# Patient Record
Sex: FEMALE | Race: WHITE | NOT HISPANIC OR LATINO | Employment: FULL TIME | URBAN - NONMETROPOLITAN AREA
[De-identification: names, ages, dates, MRNs, and addresses within clinical notes are randomized per-mention and may not be internally consistent; named-entity substitution may affect disease eponyms.]

---
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
## 2018-01-07 ENCOUNTER — HOSPITAL ENCOUNTER (EMERGENCY)
Facility: HOSPITAL | Age: 18
Discharge: HOME | End: 2018-01-08
Attending: EMERGENCY MEDICINE | Admitting: EMERGENCY MEDICINE

## 2018-01-07 DIAGNOSIS — J20.9 ACUTE BRONCHITIS, UNSPECIFIED ORGANISM: Primary | ICD-10-CM

## 2018-01-07 DIAGNOSIS — J45.901 EXACERBATION OF ASTHMA, UNSPECIFIED ASTHMA SEVERITY, UNSPECIFIED WHETHER PERSISTENT: ICD-10-CM

## 2018-01-07 LAB
FLUAV AG NPH QL: NEGATIVE
FLUBV AG NPH QL IA: NEGATIVE

## 2018-01-07 PROCEDURE — 87804 INFLUENZA ASSAY W/OPTIC: CPT | Performed by: EMERGENCY MEDICINE

## 2018-01-07 PROCEDURE — 81025 URINE PREGNANCY TEST: CPT | Performed by: PHYSICIAN ASSISTANT

## 2018-01-07 PROCEDURE — 94760 N-INVAS EAR/PLS OXIMETRY 1: CPT

## 2018-01-07 PROCEDURE — 81001 URINALYSIS AUTO W/SCOPE: CPT | Performed by: PHYSICIAN ASSISTANT

## 2018-01-07 PROCEDURE — 94799 UNLISTED PULMONARY SVC/PX: CPT

## 2018-01-07 PROCEDURE — 94640 AIRWAY INHALATION TREATMENT: CPT

## 2018-01-07 PROCEDURE — 87086 URINE CULTURE/COLONY COUNT: CPT | Performed by: PHYSICIAN ASSISTANT

## 2018-01-07 PROCEDURE — 99283 EMERGENCY DEPT VISIT LOW MDM: CPT

## 2018-01-07 RX ORDER — ALBUTEROL SULFATE 2.5 MG/3ML
2.5 SOLUTION RESPIRATORY (INHALATION) ONCE
Status: COMPLETED | OUTPATIENT
Start: 2018-01-07 | End: 2018-01-07

## 2018-01-07 RX ADMIN — ALBUTEROL SULFATE 2.5 MG: 2.5 SOLUTION RESPIRATORY (INHALATION) at 23:49

## 2018-01-08 ENCOUNTER — APPOINTMENT (OUTPATIENT)
Dept: GENERAL RADIOLOGY | Facility: HOSPITAL | Age: 18
End: 2018-01-08

## 2018-01-08 VITALS
BODY MASS INDEX: 42.68 KG/M2 | OXYGEN SATURATION: 99 % | RESPIRATION RATE: 18 BRPM | DIASTOLIC BLOOD PRESSURE: 84 MMHG | SYSTOLIC BLOOD PRESSURE: 127 MMHG | WEIGHT: 250 LBS | TEMPERATURE: 98.7 F | HEIGHT: 64 IN | HEART RATE: 99 BPM

## 2018-01-08 LAB
B-HCG UR QL: NEGATIVE
BACTERIA UR QL AUTO: ABNORMAL /HPF
BILIRUB UR QL STRIP: NEGATIVE
CLARITY UR: CLEAR
COLOR UR: YELLOW
GLUCOSE UR STRIP-MCNC: NEGATIVE MG/DL
HGB UR QL STRIP.AUTO: ABNORMAL
HYALINE CASTS UR QL AUTO: ABNORMAL /LPF
KETONES UR QL STRIP: NEGATIVE
LEUKOCYTE ESTERASE UR QL STRIP.AUTO: NEGATIVE
NITRITE UR QL STRIP: NEGATIVE
PH UR STRIP.AUTO: 5.5 [PH] (ref 5–8)
PROT UR QL STRIP: NEGATIVE
RBC # UR: ABNORMAL /HPF
REF LAB TEST METHOD: ABNORMAL
SP GR UR STRIP: 1.02 (ref 1–1.03)
SQUAMOUS #/AREA URNS HPF: ABNORMAL /HPF
UROBILINOGEN UR QL STRIP: ABNORMAL
WBC UR QL AUTO: ABNORMAL /HPF

## 2018-01-08 PROCEDURE — 71046 X-RAY EXAM CHEST 2 VIEWS: CPT

## 2018-01-08 RX ORDER — AZITHROMYCIN 250 MG/1
500 TABLET, FILM COATED ORAL ONCE
Status: COMPLETED | OUTPATIENT
Start: 2018-01-08 | End: 2018-01-08

## 2018-01-08 RX ORDER — AZITHROMYCIN 250 MG/1
TABLET, FILM COATED ORAL
Qty: 4 TABLET | Refills: 0 | Status: SHIPPED | OUTPATIENT
Start: 2018-01-08 | End: 2018-01-24

## 2018-01-08 RX ADMIN — AZITHROMYCIN 500 MG: 250 TABLET, FILM COATED ORAL at 01:12

## 2018-01-08 NOTE — DISCHARGE INSTRUCTIONS
Finish the steroid pack.  Continue your inhaler.    Discontinue amoxicillin.    Return to the ER for markedly labored breathing, chest pain, shortness of breath, sustained heart racing, passing out, new or worsening symptoms.    Follow-up with your doctor in 1-2 days for further workup, treatment and evaluation if not improving.  If you do not have a doctor, follow-up with the [facility].  Call for appointment.

## 2018-01-08 NOTE — ED PROVIDER NOTES
Subjective   HPI Comments: 17-year-old female with a history of asthma and is a nonsmoker.  She is here with a 1.5 week history of clear and yellow/green nasal and chest congestion associated with shortness of breath and wheezing.  Also some maxillary and frontal sinus pressure.  Mild sore throat and no earache.  Also complains of being dizzy with the coughing fits.  Complains of intermittent wheezing and shortness of breath as well.  She has been on amoxicillin, tapering dose of steroids, albuterol inhaler, neb treatments and over-the-counter cold medications for the past week and is not improving.    Aggravating factors: None.  Alleviating factors: Inhalers and breathing treatments.  Treatment prior to arrival: Amoxicillin, steroids, over-the-counter cold medications, albuterol inhaler and neb treatments.    LMP more than one month ago.  Always irregular and denies the possibility of pregnancy.      History provided by:  Patient  History limited by:  Acuity of condition   used: No        Review of Systems   Constitutional: Positive for chills.   HENT: Positive for congestion, rhinorrhea, sinus pain, sinus pressure and sore throat.    Eyes: Negative.    Respiratory: Positive for cough, shortness of breath and wheezing.    Cardiovascular: Negative.  Negative for chest pain.   Gastrointestinal: Negative.  Negative for abdominal pain.   Endocrine: Negative.    Genitourinary: Negative for dysuria.   Musculoskeletal: Negative.    Skin: Negative.    Allergic/Immunologic: Negative.    Neurological: Positive for dizziness and headaches.   Hematological: Negative.    Psychiatric/Behavioral: Negative.    All other systems reviewed and are negative.      Past Medical History:   Diagnosis Date   • Anxiety    • Asthma    • B12 deficiency    • Depression    • Pneumonia    • Seasonal allergies        Allergies   Allergen Reactions   • Hepatitis B Virus Vaccine    • Meningococcal Vaccines        Past Surgical  History:   Procedure Laterality Date   • ADENOIDECTOMY     • TONSILLECTOMY         Family History   Problem Relation Age of Onset   • Bipolar disorder Maternal Aunt    • Arthritis Maternal Aunt    • Cancer Maternal Uncle      Lung   • Lung cancer Maternal Uncle    • Arthritis Maternal Grandmother    • Heart disease Maternal Grandmother    • Hyperlipidemia Maternal Grandmother    • Hypertension Maternal Grandmother    • Coronary artery disease Maternal Grandmother      CABG   • Cancer Maternal Grandfather      asbestos   • Lung cancer Maternal Grandfather    • No Known Problems Mother    • Arthritis Father    • Depression Brother    • No Known Problems Paternal Aunt    • No Known Problems Paternal Uncle    • No Known Problems Paternal Grandmother    • No Known Problems Paternal Grandfather    • Bipolar disorder Cousin        Social History     Social History   • Marital status: Single     Spouse name: N/A   • Number of children: N/A   • Years of education: N/A     Social History Main Topics   • Smoking status: Never Smoker   • Smokeless tobacco: Never Used   • Alcohol use No   • Drug use: No   • Sexual activity: Yes     Partners: Male     Birth control/ protection: OCP, Injection      Comment: pt states not preg     Other Topics Concern   • None     Social History Narrative           Objective   Physical Exam   Constitutional: She is oriented to person, place, and time. She appears well-developed and well-nourished. No distress.   HENT:   Head: Normocephalic and atraumatic.   Right Ear: External ear normal.   Left Ear: External ear normal.   Tympanic membranes are normal.  Pharynx is injected with normal-appearing tonsils.  Nasal mucosa is injected.  Maxillary and frontal sinuses are tender to percussion.   Eyes: EOM are normal. Pupils are equal, round, and reactive to light.   Neck: Normal range of motion. Neck supple.   Cardiovascular: Normal rate, regular rhythm and normal heart sounds.    Pulmonary/Chest: Effort  normal. No stridor. She has wheezes. She exhibits no tenderness.   Musculoskeletal: Normal range of motion. She exhibits no edema.   Neurological: She is alert and oriented to person, place, and time.   Skin: Skin is warm and dry. No rash noted. She is not diaphoretic.   Psychiatric: She has a normal mood and affect. Her behavior is normal. Judgment and thought content normal.   Nursing note and vitals reviewed.      Procedures         ED Course  ED Course                  MDM  Number of Diagnoses or Management Options  Acute bronchitis, unspecified organism: new and requires workup  Exacerbation of asthma, unspecified asthma severity, unspecified whether persistent: new and requires workup     Amount and/or Complexity of Data Reviewed  Clinical lab tests: ordered and reviewed  Tests in the radiology section of CPT®: ordered and reviewed  Independent visualization of images, tracings, or specimens: yes    Risk of Complications, Morbidity, and/or Mortality  Presenting problems: moderate  Diagnostic procedures: low  Management options: moderate    Patient Progress  Patient progress: stable      Final diagnoses:   Acute bronchitis, unspecified organism   Exacerbation of asthma, unspecified asthma severity, unspecified whether persistent            Judi Menendez PA-C  01/08/18 0056

## 2018-01-09 LAB — BACTERIA SPEC AEROBE CULT: NO GROWTH

## 2018-01-24 ENCOUNTER — OFFICE VISIT (OUTPATIENT)
Dept: INTERNAL MEDICINE | Facility: CLINIC | Age: 18
End: 2018-01-24

## 2018-01-24 VITALS
OXYGEN SATURATION: 90 % | TEMPERATURE: 98.2 F | HEART RATE: 104 BPM | SYSTOLIC BLOOD PRESSURE: 128 MMHG | BODY MASS INDEX: 43.02 KG/M2 | WEIGHT: 252 LBS | HEIGHT: 64 IN | DIASTOLIC BLOOD PRESSURE: 78 MMHG

## 2018-01-24 DIAGNOSIS — R10.9 ABDOMINAL CRAMPS: ICD-10-CM

## 2018-01-24 DIAGNOSIS — R05.9 COUGH: Primary | ICD-10-CM

## 2018-01-24 DIAGNOSIS — J10.1 INFLUENZA A: ICD-10-CM

## 2018-01-24 LAB
EXPIRATION DATE: ABNORMAL
FLUAV AG NPH QL: POSITIVE
FLUBV AG NPH QL: NEGATIVE
INTERNAL CONTROL: ABNORMAL
Lab: ABNORMAL

## 2018-01-24 PROCEDURE — 87804 INFLUENZA ASSAY W/OPTIC: CPT | Performed by: PHYSICIAN ASSISTANT

## 2018-01-24 PROCEDURE — 99213 OFFICE O/P EST LOW 20 MIN: CPT | Performed by: PHYSICIAN ASSISTANT

## 2018-01-24 RX ORDER — NAPROXEN 500 MG/1
500 TABLET ORAL 2 TIMES DAILY PRN
Qty: 30 TABLET | Refills: 3 | Status: SHIPPED | OUTPATIENT
Start: 2018-01-24 | End: 2018-03-28

## 2018-01-24 RX ORDER — OSELTAMIVIR PHOSPHATE 75 MG/1
75 CAPSULE ORAL 2 TIMES DAILY
Qty: 10 CAPSULE | Refills: 0 | Status: SHIPPED | OUTPATIENT
Start: 2018-01-24 | End: 2018-03-28

## 2018-01-24 NOTE — PROGRESS NOTES
"Subjective     Chief Complaint: body aches, cough    History of Present Illness     Desi Pace is a 17 y.o. female presenting with complaints of fever body aches, headches, ear pain, sore throat, cough beginning yesterday evening.  Increased shortness of breath and wheezing, has been using her albuterol inhaler at home.  Close contact with the flu.  She has not tried any medication OTC.  Denies sinus pressure, chest pain, nausea, vomiting, diarrhea.    The following portions of the patient's history were reviewed and updated as appropriate: current medications, allergies, PMH.    Review of Systems   Constitutional: Positive for fatigue and fever.   HENT: Positive for congestion, ear pain, rhinorrhea and sore throat. Negative for sinus pressure.    Respiratory: Positive for cough, shortness of breath and wheezing. Negative for chest tightness.    Cardiovascular: Negative for chest pain, palpitations and leg swelling.   Gastrointestinal: Negative for abdominal pain, blood in stool, constipation, diarrhea, nausea and vomiting.   Musculoskeletal:        Body aches   Neurological: Positive for headaches. Negative for dizziness and weakness.       Objective     Vitals:    01/24/18 1329   BP: 128/78   Pulse: 104   Temp: 98.2 °F (36.8 °C)   SpO2: 90%   Weight: 114 kg (252 lb)   Height: 162.6 cm (64.02\")       Physical Exam   Constitutional: She is oriented to person, place, and time.   Patient appears ill.   HENT:   Head: Normocephalic and atraumatic.   Right Ear: External ear normal.   Left Ear: External ear normal.   Nose: Mucosal edema and rhinorrhea present.   Mouth/Throat: Posterior oropharyngeal erythema present.   TMs dull bilaterally.   Eyes: EOM are normal. Pupils are equal, round, and reactive to light.   Cardiovascular: Regular rhythm and normal heart sounds.  Tachycardia present.    Pulmonary/Chest: Effort normal and breath sounds normal. No respiratory distress. She has no wheezes.   Used inhaler prior to " appointment.   Musculoskeletal: Normal range of motion.   Lymphadenopathy:     She has no cervical adenopathy.   Neurological: She is alert and oriented to person, place, and time.   Skin: Skin is warm and dry.   Psychiatric: She has a normal mood and affect.      Assessment/Plan     Diagnoses and all orders for this visit:    Cough  -     POCT Influenza A/B  Influenza A  -     oseltamivir (TAMIFLU) 75 MG capsule; Take 1 capsule by mouth 2 (Two) Times a Day.      Influenza A+.  Rest, push fluids, Tylenol or Motrin as needed for headache and fever.  May use her albuterol inhaler as needed for shortness of breath and wheezing.  RTC if symptoms worsen or fail to improve.    Torrie Cardona PA-C  01/24/2018         Please note that portions of this note were completed with a voice recognition program. Efforts were made to edit dictation, but occasionally words are mistranscribed.

## 2018-01-25 ENCOUNTER — OFFICE VISIT (OUTPATIENT)
Dept: INTERNAL MEDICINE | Facility: CLINIC | Age: 18
End: 2018-01-25

## 2018-01-25 VITALS
RESPIRATION RATE: 16 BRPM | WEIGHT: 245.19 LBS | TEMPERATURE: 98.3 F | HEIGHT: 64 IN | BODY MASS INDEX: 41.86 KG/M2 | SYSTOLIC BLOOD PRESSURE: 108 MMHG | OXYGEN SATURATION: 97 % | HEART RATE: 104 BPM | DIASTOLIC BLOOD PRESSURE: 76 MMHG

## 2018-01-25 DIAGNOSIS — R11.2 NON-INTRACTABLE VOMITING WITH NAUSEA, UNSPECIFIED VOMITING TYPE: Primary | ICD-10-CM

## 2018-01-25 DIAGNOSIS — J10.1 INFLUENZA A: ICD-10-CM

## 2018-01-25 PROCEDURE — 99213 OFFICE O/P EST LOW 20 MIN: CPT | Performed by: NURSE PRACTITIONER

## 2018-01-25 RX ORDER — ONDANSETRON 4 MG/1
4 TABLET, ORALLY DISINTEGRATING ORAL EVERY 8 HOURS PRN
Qty: 10 TABLET | Refills: 0 | Status: SHIPPED | OUTPATIENT
Start: 2018-01-25 | End: 2018-02-19

## 2018-01-25 RX ORDER — DEXTROMETHORPHAN HYDROBROMIDE AND PROMETHAZINE HYDROCHLORIDE 15; 6.25 MG/5ML; MG/5ML
5 SYRUP ORAL 4 TIMES DAILY PRN
Qty: 118 ML | Refills: 0 | Status: SHIPPED | OUTPATIENT
Start: 2018-01-25 | End: 2018-03-28

## 2018-01-25 NOTE — PROGRESS NOTES
Chief Complaint / Reason:      Chief Complaint   Patient presents with   • Vomiting     dx'ed with flu here yesterday, has been vomiting a lot. Hx of pneumonia.       Subjective     HPI  Presents today with complaints of vomiting.  She states that she was diagnosed with flu at this office yesterday and saw LISSET Brownlee.  She was prescribed Tamiflu and is currently taking and states that she is able to keep it down but she has been vomiting a lot.  She states she has a history of pneumonia and is concerned that she is not getting better.  Her oxygen saturation yesterday was 90% and today it is 97% patient is sneezing occasionally and has a runny nose but is currently afebrile heart rate is elevated and blood pressure is lower than previous.  She denies chest pain, shortness of breath or heart palpitations.  She states she is achy from coughing and puking.  Denies any blood in vomit or cough.  History taken from: patient    PMH/FH/Social History were reviewed and updated appropriately in the electronic medical record.     Review of Systems:   Review of Systems   Constitutional: Positive for fatigue.   HENT: Positive for congestion, rhinorrhea, sneezing and sore throat. Negative for sinus pressure.    Respiratory: Positive for cough and wheezing. Negative for chest tightness.    Cardiovascular: Negative for chest pain, palpitations and leg swelling.   Gastrointestinal: Positive for nausea and vomiting. Negative for abdominal pain, blood in stool, constipation and diarrhea.   Musculoskeletal: Positive for myalgias.   Neurological: Positive for headaches. Negative for dizziness and weakness.     All other systems were reviewed and are negative.  Exceptions are noted in the subjective or above.      Objective     Vital Signs  Vitals:    01/25/18 1009   BP: 108/76   Pulse: 104   Resp: 16   Temp: 98.3 °F (36.8 °C)   SpO2: 97%       Body mass index is 42.09 kg/(m^2).    Physical Exam   Constitutional: She is oriented to  person, place, and time. She appears distressed.   HENT:   Head: Normocephalic and atraumatic.   Right Ear: External ear normal. Tympanic membrane is erythematous and bulging.   Left Ear: External ear normal. Tympanic membrane is erythematous and bulging.   Nose: Mucosal edema and rhinorrhea present.   Mouth/Throat: Posterior oropharyngeal erythema present.   Eyes: EOM are normal. Pupils are equal, round, and reactive to light.   Cardiovascular: Regular rhythm and normal heart sounds.  Tachycardia present.    Pulmonary/Chest: Effort normal and breath sounds normal. No respiratory distress. She has no wheezes.   Abdominal: Soft. Bowel sounds are normal. There is tenderness.   Musculoskeletal: Normal range of motion.   Lymphadenopathy:     She has no cervical adenopathy.   Neurological: She is alert and oriented to person, place, and time.   Skin: Skin is warm and dry.   Psychiatric: She has a normal mood and affect.   Nursing note and vitals reviewed.       Results Review:    I reviewed the patient's previous clinical results.       Medication Review:     Current Outpatient Prescriptions:   •  albuterol (ACCUNEB) 1.25 MG/3ML nebulizer solution, Take 3 mL by nebulization Every 6 (Six) Hours As Needed for Wheezing., Disp: 50 vial, Rfl: 2  •  albuterol (PROVENTIL HFA;VENTOLIN HFA) 108 (90 Base) MCG/ACT inhaler, Inhale 2 puffs Every 4 (Four) Hours As Needed for Wheezing., Disp: 1 inhaler, Rfl: 2  •  naproxen (NAPROSYN) 500 MG tablet, Take 1 tablet by mouth 2 (Two) Times a Day As Needed for Mild Pain ., Disp: 30 tablet, Rfl: 3  •  oseltamivir (TAMIFLU) 75 MG capsule, Take 1 capsule by mouth 2 (Two) Times a Day., Disp: 10 capsule, Rfl: 0  •  ondansetron ODT (ZOFRAN ODT) 4 MG disintegrating tablet, Take 1 tablet by mouth Every 8 (Eight) Hours As Needed for Nausea or Vomiting., Disp: 10 tablet, Rfl: 0  •  promethazine-dextromethorphan (PROMETHAZINE-DM) 6.25-15 MG/5ML syrup, Take 5 mL by mouth 4 (Four) Times a Day As Needed  for Cough., Disp: 118 mL, Rfl: 0    Assessment/Plan   Desi was seen today for vomiting.    Diagnoses and all orders for this visit:    Non-intractable vomiting with nausea, unspecified vomiting type  -     promethazine-dextromethorphan (PROMETHAZINE-DM) 6.25-15 MG/5ML syrup; Take 5 mL by mouth 4 (Four) Times a Day As Needed for Cough.  -     ondansetron ODT (ZOFRAN ODT) 4 MG disintegrating tablet; Take 1 tablet by mouth Every 8 (Eight) Hours As Needed for Nausea or Vomiting.  Increase water intake and keep mouth moistened  Discussed dietary modifications and avoid greasy/spicy foods and caffeine drinks  Influenza A  Continue taking tamiflu as prescribed       Return if symptoms worsen or fail to improve.    Aleisha Mcgee, APRN  01/25/2018

## 2018-02-22 ENCOUNTER — TELEPHONE (OUTPATIENT)
Dept: URGENT CARE | Facility: CLINIC | Age: 18
End: 2018-02-22

## 2018-02-22 NOTE — TELEPHONE ENCOUNTER
----- Message from Claude Holden MD sent at 2/21/2018  7:55 PM EST -----  Culture is noted.  Cipro sent to her pharmacy -Claude Holden M.D.

## 2018-02-22 NOTE — TELEPHONE ENCOUNTER
Message left for patient that her culture grew bacteria and the doctor has sent in a prescription for Cipro to her pharmacy, for any questions she may contact [facility].

## 2018-03-28 ENCOUNTER — OFFICE VISIT (OUTPATIENT)
Dept: OBSTETRICS AND GYNECOLOGY | Facility: CLINIC | Age: 18
End: 2018-03-28

## 2018-03-28 VITALS
DIASTOLIC BLOOD PRESSURE: 70 MMHG | HEIGHT: 65 IN | WEIGHT: 252 LBS | SYSTOLIC BLOOD PRESSURE: 128 MMHG | BODY MASS INDEX: 41.99 KG/M2

## 2018-03-28 DIAGNOSIS — N83.201 RIGHT OVARIAN CYST: ICD-10-CM

## 2018-03-28 DIAGNOSIS — N91.2 AMENORRHEA: Primary | ICD-10-CM

## 2018-03-28 PROCEDURE — 99204 OFFICE O/P NEW MOD 45 MIN: CPT | Performed by: OBSTETRICS & GYNECOLOGY

## 2018-03-28 NOTE — PROGRESS NOTES
Subjective  Chief Complaint   Patient presents with   • Ovarian Cyst     Patient advised hasn't had menses x 3 years until last month, went to urgent care and diagnosed with ovarian cysts.    • Amenorrhea     Patient is 17 y.o.  here for evaluation of amenorrhea and ovarian cysts.  Pt gives history of being on DMPA for approximately x 1 year; received last dose 3 years ago.  Pt was also on ocps x 4 months with the injection for bleeding.  Pt has not had a menses since then until last month.  Pt reports having an episode of pelvic pain bilateral and midline; lower abdominal/pelvic pain; intermittent in nature; severe at times; cramping in nature.  Pt saw primary care physician and had labs done as well as TVS.  TVS was on ; report obtained and reviewed showing Uterus normal size; ET 6 mm; nabothian cysts of cervix; right ovary with 2.6x2.3 cm cyst with thin septations; left ovary with multiple tiny follicles; no free fluid.  Pt reports starting her menses shortly thereafter.  Pt reports pain has improved but continues to have intermittent episodes.  Pt on menses now; started 3/26.  Pt reports menses is like normal for her; lasting 5-6 days and changing pad q 3 hours.  Pt does not want to be on contraception; pt okay if gets pregnant; not actively trying but does not want to prevent.  Pt reports having labs done with primary care as well; pt told vit b12, vit d deficiency.  Pt also had abdominal ultrasound done at same time showing fatty liver infiltration.  Pt with no family history of clotting disorder.  +DM in paternal aunt.    History  Past Medical History:   Diagnosis Date   • Amenorrhea due to Depo Provera    • Anemia    • Anxiety    • Asthma    • B12 deficiency    • Depression    • GERD (gastroesophageal reflux disease)    • Pneumonia    • Seasonal allergies      Current Outpatient Prescriptions on File Prior to Visit   Medication Sig Dispense Refill   • albuterol (ACCUNEB) 1.25 MG/3ML nebulizer  solution Take 3 mL by nebulization Every 6 (Six) Hours As Needed for Wheezing. 50 vial 2   • albuterol (PROVENTIL HFA;VENTOLIN HFA) 108 (90 Base) MCG/ACT inhaler Inhale 2 puffs Every 4 (Four) Hours As Needed for Wheezing. 1 inhaler 2   • [DISCONTINUED] naproxen (NAPROSYN) 500 MG tablet Take 1 tablet by mouth 2 (Two) Times a Day As Needed for Mild Pain . 30 tablet 3   • [DISCONTINUED] ondansetron ODT (ZOFRAN ODT) 4 MG disintegrating tablet Take 1 tablet by mouth Every 8 (Eight) Hours As Needed for Nausea or Vomiting. 20 tablet 0   • [DISCONTINUED] oseltamivir (TAMIFLU) 75 MG capsule Take 1 capsule by mouth 2 (Two) Times a Day. 10 capsule 0   • [DISCONTINUED] promethazine-dextromethorphan (PROMETHAZINE-DM) 6.25-15 MG/5ML syrup Take 5 mL by mouth 4 (Four) Times a Day As Needed for Cough. 118 mL 0     No current facility-administered medications on file prior to visit.      Allergies   Allergen Reactions   • Hepatitis B Virus Vaccine    • Meningococcal Vaccines      Past Surgical History:   Procedure Laterality Date   • ADENOIDECTOMY     • TONSILLECTOMY       Family History   Problem Relation Age of Onset   • Bipolar disorder Maternal Aunt    • Arthritis Maternal Aunt    • Cancer Maternal Uncle      Lung   • Lung cancer Maternal Uncle    • Arthritis Maternal Grandmother    • Heart disease Maternal Grandmother    • Hyperlipidemia Maternal Grandmother    • Hypertension Maternal Grandmother    • Coronary artery disease Maternal Grandmother      CABG   • Cancer Maternal Grandfather      asbestos   • Lung cancer Maternal Grandfather    • No Known Problems Mother    • Arthritis Father    • Depression Brother    • No Known Problems Paternal Aunt    • No Known Problems Paternal Uncle    • No Known Problems Paternal Grandmother    • No Known Problems Paternal Grandfather    • Bipolar disorder Cousin      Social History     Social History   • Marital status: Single     Social History Main Topics   • Smoking status: Never Smoker  "  • Smokeless tobacco: Never Used   • Alcohol use No   • Drug use: No   • Sexual activity: Yes     Partners: Male     Birth control/ protection: None      Comment: pt states not preg     Other Topics Concern   • Not on file     Review of Systems  All systems were reviewed and negative except for:  Gastrointestinal: postitive for  nausea and vomiting  Genitourinary: postivie for  pelvic pain  Musculoskeletal: positive for  joint pain     Objective  Vitals:    03/28/18 1438   BP: 128/70   Weight: 114 kg (252 lb)   Height: 165.1 cm (65\")     Physical Exam:  General Appearance: alert, appears stated age and cooperative  Head: normocephalic, without obvious abnormality and atraumatic  Eyes: lids and lashes normal, conjunctivae and sclerae normal, no icterus, no pallor, corneas clear and PERRLA  Ears: ears appear intact with no abnormalities noted  Nose: nares normal, septum midline, mucosa normal and no drainage  Neck: suppple, trachea midline and no thyromegaly  Lungs: clear to auscultation, respirations regular, respirations even and respirations unlabored  Heart: regular rhythm and normal rate, normal S1, S2, no murmur, gallop, or rubs and no click  Breasts: Not performed.  Abdomen: normal bowel sounds, no masses, no hepatomegaly, no splenomegaly, soft non-tender, no guarding and no rebound tenderness  Pelvic: Not performed.  Extremities: moves extremities well, no edema, no cyanosis and no redness  Skin: no bleeding, bruising or rash and no lesions noted  Lymph Nodes: no palpable adenopathy  Neuro: CN II-X grossly intact; sensation intact  Psych: normal mood and affect, oriented to person, time and place, thought content organized and appropriate judgment    Lab Review   No data reviewed    Imaging   Pelvic ultrasound report  Abdominal ultrasound    Assessment/Plan  Problem List Items Addressed This Visit     None      Visit Diagnoses     Amenorrhea    -  Primary  Pt with last two cycles normal.  Pt is not fasting " today.  Pt to return for following blood work.  Pt to sign to obtain copy of previous labs.  Scans reviewed as noted.  Pt to return for TVS post menses and to f/u ovarian cyst.  Plan pending results.    Relevant Orders    US Non-ob Transvaginal    HCG, B-subunit, Quantitative    Glucose, Random    DHEA-Sulfate    17-Hydroxyprogesterone    TSH    Testosterone, Free, Total    Prolactin    Insulin, Random    Right ovarian cyst      See plan above.        Follow up as discussed   This note was electronically signed.  Cathleen Mathis M.D.

## 2018-05-18 ENCOUNTER — OFFICE VISIT (OUTPATIENT)
Dept: OBSTETRICS AND GYNECOLOGY | Facility: CLINIC | Age: 18
End: 2018-05-18

## 2018-05-18 VITALS
HEIGHT: 65 IN | WEIGHT: 255 LBS | DIASTOLIC BLOOD PRESSURE: 60 MMHG | SYSTOLIC BLOOD PRESSURE: 100 MMHG | BODY MASS INDEX: 42.49 KG/M2

## 2018-05-18 DIAGNOSIS — N91.2 AMENORRHEA: Primary | ICD-10-CM

## 2018-05-18 DIAGNOSIS — N83.201 RIGHT OVARIAN CYST: ICD-10-CM

## 2018-05-18 DIAGNOSIS — R10.2 PELVIC PAIN: ICD-10-CM

## 2018-05-18 DIAGNOSIS — N83.202 LEFT OVARIAN CYST: ICD-10-CM

## 2018-05-18 PROCEDURE — 99213 OFFICE O/P EST LOW 20 MIN: CPT | Performed by: OBSTETRICS & GYNECOLOGY

## 2018-05-18 NOTE — PROGRESS NOTES
Subjective  Chief Complaint   Patient presents with   • Follow-up     TRANSVAGINAL ULTRASOUND, AMENORRHEA.      Patient is 17 y.o.  here for f/u TVS for evaluation of right ovarian cyst as well as history of amenorrhea.  Pt was on menses at time of last visit.  Pt had first menses in 3 years.  Pt reports she had another one on ; pt reports lasting for several days; not heavy.  Pt has had pelvic pain; cramping with the menses as well as bilateral lower pelvic pain; R>L.  Pt reports at times the pain is sharp, stabbing in nature.  Pt reports pain does not appear associated with cycles.  Pt with no other associated symptoms.  Pt did not get labs done as previously ordered.  Pt does not want to be on any contraception at present.  Previous scan on  showed right 2.6x2x2.3 cm complex cyst    History  Past Medical History:   Diagnosis Date   • Amenorrhea due to Depo Provera    • Anemia    • Anxiety    • Asthma    • B12 deficiency    • Depression    • GERD (gastroesophageal reflux disease)    • Pneumonia    • Seasonal allergies      Current Outpatient Prescriptions on File Prior to Visit   Medication Sig Dispense Refill   • ibuprofen (ADVIL,MOTRIN) 200 MG tablet Take 200 mg by mouth Every 6 (Six) Hours As Needed for Mild Pain  or Fever.       No current facility-administered medications on file prior to visit.      Allergies   Allergen Reactions   • Hepatitis B Virus Vaccine    • Meningococcal Vaccines      Past Surgical History:   Procedure Laterality Date   • ADENOIDECTOMY     • TONSILLECTOMY       Family History   Problem Relation Age of Onset   • Bipolar disorder Maternal Aunt    • Arthritis Maternal Aunt    • Cancer Maternal Uncle         Lung   • Lung cancer Maternal Uncle    • Arthritis Maternal Grandmother    • Heart disease Maternal Grandmother    • Hyperlipidemia Maternal Grandmother    • Hypertension Maternal Grandmother    • Coronary artery disease Maternal Grandmother         CABG   • Cancer  "Maternal Grandfather         asbestos   • Lung cancer Maternal Grandfather    • No Known Problems Mother    • Arthritis Father    • Depression Brother    • No Known Problems Paternal Aunt    • No Known Problems Paternal Uncle    • No Known Problems Paternal Grandmother    • No Known Problems Paternal Grandfather    • Bipolar disorder Cousin      Social History     Social History   • Marital status: Single     Social History Main Topics   • Smoking status: Never Smoker   • Smokeless tobacco: Never Used   • Alcohol use No   • Drug use: No   • Sexual activity: Yes     Partners: Male     Birth control/ protection: None      Comment: pt states not preg     Other Topics Concern   • Not on file     Review of Systems  The following systems were reviewed and negative:  constitution, eyes, ENT, respiratory, cardiovascular, gastrointestinal, genitourinary, integument, breast, hematologic / lymphatic, musculoskeletal, neurological, behavioral/psych, endocrine and allergies / immunologic     Objective  Vitals:    05/18/18 1321   BP: 100/60   Weight: 116 kg (255 lb)   Height: 165.1 cm (65\")     Physical Exam:  General Appearance: alert, appears stated age and cooperative  Head: normocephalic, without obvious abnormality and atraumatic  Eyes: lids and lashes normal, conjunctivae and sclerae normal, no icterus, no pallor, corneas clear and PERRLA  Ears: ears appear intact with no abnormalities noted  Nose: nares normal, septum midline, mucosa normal and no drainage  Neck: suppple, trachea midline and no thyromegaly  Lungs: clear to auscultation, respirations regular, respirations even and respirations unlabored  Heart: regular rhythm and normal rate, normal S1, S2, no murmur, gallop, or rubs and no click  Breasts: Not performed.  Abdomen: normal bowel sounds, no masses, no hepatomegaly, no splenomegaly, soft non-tender, no guarding and no rebound tenderness  Pelvic: Not performed.  Extremities: moves extremities well, no edema, no " cyanosis and no redness  Skin: no bleeding, bruising or rash and no lesions noted  Lymph Nodes: no palpable adenopathy  Neuro: CN II-X grossly intact; sensation intact  Psych: normal mood and affect, oriented to person, time and place, thought content organized and appropriate judgment    Lab Review   No data reviewed    Imaging   Pelvic ultrasound report  Pelvic ultrasound images independantly reviewed - TVS today shows AV normal uteurs, ET 7.36 mm; right ovary with multiple small follicles; left ovary with 1.5 cm simple cyst; bilateral ovaries with normal flow; no free fluid seen.    Assessment/Plan  Problem List Items Addressed This Visit     None      Visit Diagnoses     Amenorrhea    -  Primary  Pt with long history of amenorrhea x 3 years following DMPA.  Pt has had two cycles recently on her own.  Pt due for menses now.  Pt did not do labs as previously ordered.  Pt reports she will do Saturday.  Will await results as well as see if patient has cycle this month.  If not then may need to consider cyclic provera pending labs.    Right ovarian cyst    Resolved  Previous complex ovarian cyst resolved.    Pelvic pain    New  Patient reports pelvic pain not associated with menses.  Pt informed pain may be secondary to resolution of right ovarian cyst.  Plan expectant management at present.  Instructions and precautions given.  TVS as noted today.  Plan pending labs.    Left ovarian cyst    New  Pt with simple 1.5 cm cyst; pt informed regarding findings.  Instructions and precautions given.  Pt may repeat scan post menses if desires but otherwise no need for f/u.        Follow up as discussed   This note was electronically signed.  Cathleen Mathis M.D.

## 2018-05-19 ENCOUNTER — LAB (OUTPATIENT)
Dept: LAB | Facility: HOSPITAL | Age: 18
End: 2018-05-19
Attending: OBSTETRICS & GYNECOLOGY

## 2018-05-19 DIAGNOSIS — N91.2 AMENORRHEA: ICD-10-CM

## 2018-05-19 LAB
GLUCOSE BLD-MCNC: 88 MG/DL (ref 74–98)
HCG INTACT+B SERPL-ACNC: <2.39 MIU/ML
TSH SERPL DL<=0.05 MIU/L-ACNC: 0.93 MIU/ML (ref 0.47–4.68)

## 2018-05-19 PROCEDURE — 84702 CHORIONIC GONADOTROPIN TEST: CPT

## 2018-05-19 PROCEDURE — 84403 ASSAY OF TOTAL TESTOSTERONE: CPT

## 2018-05-19 PROCEDURE — 83498 ASY HYDROXYPROGESTERONE 17-D: CPT

## 2018-05-19 PROCEDURE — 84402 ASSAY OF FREE TESTOSTERONE: CPT

## 2018-05-19 PROCEDURE — 84443 ASSAY THYROID STIM HORMONE: CPT

## 2018-05-19 PROCEDURE — 82627 DEHYDROEPIANDROSTERONE: CPT

## 2018-05-19 PROCEDURE — 82947 ASSAY GLUCOSE BLOOD QUANT: CPT

## 2018-05-19 PROCEDURE — 84146 ASSAY OF PROLACTIN: CPT

## 2018-05-19 PROCEDURE — 83525 ASSAY OF INSULIN: CPT

## 2018-05-19 PROCEDURE — 36415 COLL VENOUS BLD VENIPUNCTURE: CPT

## 2018-05-20 LAB
DHEA-S SERPL-MCNC: 243 UG/DL (ref 110–433.2)
PROLACTIN SERPL-MCNC: 8.3 NG/ML (ref 4.8–23.3)

## 2018-05-22 LAB
TESTOST FREE SERPL-MCNC: 3.1 PG/ML
TESTOST SERPL-MCNC: 39 NG/DL

## 2018-05-24 ENCOUNTER — TELEPHONE (OUTPATIENT)
Dept: OBSTETRICS AND GYNECOLOGY | Facility: CLINIC | Age: 18
End: 2018-05-24

## 2018-05-24 LAB
17OHP SERPL-MCNC: 28 NG/DL
INSULIN SERPL-ACNC: 22 UIU/ML

## 2018-05-24 RX ORDER — MEDROXYPROGESTERONE ACETATE 10 MG/1
10 TABLET ORAL DAILY
Qty: 10 TABLET | Refills: 0 | Status: SHIPPED | OUTPATIENT
Start: 2018-05-24 | End: 2018-06-03

## 2018-05-24 NOTE — TELEPHONE ENCOUNTER
----- Message from Cathleen Mathis MD sent at 5/24/2018 12:10 PM EDT -----  Okay to inform pt labs normal other than glucose/insulin ratio which is 4 and c/w PCOS.  Recommend patient start metformin 500mg po q d x 7d then increase to bid.  Also need to see if patient has had menses; if not then will need provera 10mg x 10 d for withdrawal bleed.  Pt to f/u to discuss further treatment as well.

## 2018-06-04 ENCOUNTER — TELEPHONE (OUTPATIENT)
Dept: OBSTETRICS AND GYNECOLOGY | Facility: CLINIC | Age: 18
End: 2018-06-04

## 2018-06-04 ENCOUNTER — HOSPITAL ENCOUNTER (EMERGENCY)
Facility: HOSPITAL | Age: 18
Discharge: HOME | End: 2018-06-04
Attending: EMERGENCY MEDICINE | Admitting: EMERGENCY MEDICINE

## 2018-06-04 VITALS
TEMPERATURE: 98.7 F | HEIGHT: 65 IN | WEIGHT: 253.2 LBS | DIASTOLIC BLOOD PRESSURE: 82 MMHG | HEART RATE: 100 BPM | OXYGEN SATURATION: 99 % | RESPIRATION RATE: 18 BRPM | BODY MASS INDEX: 42.19 KG/M2 | SYSTOLIC BLOOD PRESSURE: 120 MMHG

## 2018-06-04 DIAGNOSIS — R11.2 NON-INTRACTABLE VOMITING WITH NAUSEA, UNSPECIFIED VOMITING TYPE: Primary | ICD-10-CM

## 2018-06-04 LAB
ALBUMIN SERPL-MCNC: 4.3 G/DL (ref 3.5–5)
ALBUMIN/GLOB SERPL: 1.4 G/DL (ref 1–2)
ALP SERPL-CCNC: 73 U/L (ref 38–126)
ALT SERPL W P-5'-P-CCNC: 31 U/L (ref 13–69)
ANION GAP SERPL CALCULATED.3IONS-SCNC: 10.8 MMOL/L (ref 10–20)
AST SERPL-CCNC: 21 U/L (ref 15–46)
B-HCG UR QL: NEGATIVE
BACTERIA UR QL AUTO: ABNORMAL /HPF
BASOPHILS # BLD AUTO: 0.02 10*3/MM3 (ref 0–0.2)
BASOPHILS NFR BLD AUTO: 0.2 % (ref 0–2.5)
BILIRUB SERPL-MCNC: 0.2 MG/DL (ref 0.2–1.3)
BILIRUB UR QL STRIP: NEGATIVE
BUN BLD-MCNC: 12 MG/DL (ref 7–20)
BUN/CREAT SERPL: 15 (ref 7.1–23.5)
CALCIUM SPEC-SCNC: 8.8 MG/DL (ref 8.4–10.2)
CHLORIDE SERPL-SCNC: 102 MMOL/L (ref 98–107)
CLARITY UR: CLEAR
CO2 SERPL-SCNC: 29 MMOL/L (ref 26–30)
COLOR UR: YELLOW
CREAT BLD-MCNC: 0.8 MG/DL (ref 0.6–1.3)
DEPRECATED RDW RBC AUTO: 38.8 FL (ref 37–54)
EOSINOPHIL # BLD AUTO: 0.07 10*3/MM3 (ref 0–0.7)
EOSINOPHIL NFR BLD AUTO: 0.6 % (ref 0–7)
ERYTHROCYTE [DISTWIDTH] IN BLOOD BY AUTOMATED COUNT: 13.1 % (ref 11.5–14.5)
GFR SERPL CREATININE-BSD FRML MDRD: 92 ML/MIN/1.73
GLOBULIN UR ELPH-MCNC: 3 GM/DL
GLUCOSE BLD-MCNC: 99 MG/DL (ref 74–98)
GLUCOSE UR STRIP-MCNC: NEGATIVE MG/DL
HCT VFR BLD AUTO: 35.7 % (ref 37–47)
HGB BLD-MCNC: 11.6 G/DL (ref 12–16)
HGB UR QL STRIP.AUTO: ABNORMAL
HYALINE CASTS UR QL AUTO: ABNORMAL /LPF
IMM GRANULOCYTES # BLD: 0.07 10*3/MM3 (ref 0–0.06)
IMM GRANULOCYTES NFR BLD: 0.6 % (ref 0–0.6)
KETONES UR QL STRIP: NEGATIVE
LEUKOCYTE ESTERASE UR QL STRIP.AUTO: NEGATIVE
LIPASE SERPL-CCNC: 30 U/L (ref 23–300)
LYMPHOCYTES # BLD AUTO: 2.38 10*3/MM3 (ref 0.6–3.4)
LYMPHOCYTES NFR BLD AUTO: 19.8 % (ref 10–50)
MCH RBC QN AUTO: 26.5 PG (ref 27–31)
MCHC RBC AUTO-ENTMCNC: 32.5 G/DL (ref 30–37)
MCV RBC AUTO: 81.7 FL (ref 81–99)
MONOCYTES # BLD AUTO: 0.59 10*3/MM3 (ref 0–0.9)
MONOCYTES NFR BLD AUTO: 4.9 % (ref 0–12)
MUCOUS THREADS URNS QL MICRO: ABNORMAL /HPF
NEUTROPHILS # BLD AUTO: 8.87 10*3/MM3 (ref 2–6.9)
NEUTROPHILS NFR BLD AUTO: 73.9 % (ref 37–80)
NITRITE UR QL STRIP: NEGATIVE
NRBC BLD MANUAL-RTO: 0 /100 WBC (ref 0–0)
PH UR STRIP.AUTO: 5.5 [PH] (ref 5–8)
PLATELET # BLD AUTO: 260 10*3/MM3 (ref 130–400)
PMV BLD AUTO: 9 FL (ref 6–12)
POTASSIUM BLD-SCNC: 3.8 MMOL/L (ref 3.5–5.1)
PROT SERPL-MCNC: 7.3 G/DL (ref 6.3–8.2)
PROT UR QL STRIP: NEGATIVE
RBC # BLD AUTO: 4.37 10*6/MM3 (ref 4.2–5.4)
RBC # UR: ABNORMAL /HPF
REF LAB TEST METHOD: ABNORMAL
SODIUM BLD-SCNC: 138 MMOL/L (ref 137–145)
SP GR UR STRIP: 1.02 (ref 1–1.03)
SQUAMOUS #/AREA URNS HPF: ABNORMAL /HPF
UROBILINOGEN UR QL STRIP: ABNORMAL
WBC NRBC COR # BLD: 12 10*3/MM3 (ref 4.8–10.8)
WBC UR QL AUTO: ABNORMAL /HPF

## 2018-06-04 PROCEDURE — 99283 EMERGENCY DEPT VISIT LOW MDM: CPT

## 2018-06-04 PROCEDURE — 25010000002 ONDANSETRON PER 1 MG: Performed by: NURSE PRACTITIONER

## 2018-06-04 PROCEDURE — 81001 URINALYSIS AUTO W/SCOPE: CPT | Performed by: NURSE PRACTITIONER

## 2018-06-04 PROCEDURE — 83690 ASSAY OF LIPASE: CPT | Performed by: NURSE PRACTITIONER

## 2018-06-04 PROCEDURE — 80053 COMPREHEN METABOLIC PANEL: CPT | Performed by: NURSE PRACTITIONER

## 2018-06-04 PROCEDURE — 81025 URINE PREGNANCY TEST: CPT | Performed by: NURSE PRACTITIONER

## 2018-06-04 PROCEDURE — 96374 THER/PROPH/DIAG INJ IV PUSH: CPT

## 2018-06-04 PROCEDURE — 85025 COMPLETE CBC W/AUTO DIFF WBC: CPT | Performed by: NURSE PRACTITIONER

## 2018-06-04 RX ORDER — ONDANSETRON 4 MG/1
4 TABLET, ORALLY DISINTEGRATING ORAL EVERY 6 HOURS PRN
Qty: 20 TABLET | Refills: 0 | Status: SHIPPED | OUTPATIENT
Start: 2018-06-04 | End: 2018-10-07

## 2018-06-04 RX ORDER — ONDANSETRON 2 MG/ML
4 INJECTION INTRAMUSCULAR; INTRAVENOUS ONCE
Status: COMPLETED | OUTPATIENT
Start: 2018-06-04 | End: 2018-06-04

## 2018-06-04 RX ADMIN — SODIUM CHLORIDE 1000 ML: 9 INJECTION, SOLUTION INTRAVENOUS at 16:21

## 2018-06-04 RX ADMIN — ONDANSETRON 4 MG: 2 INJECTION, SOLUTION INTRAMUSCULAR; INTRAVENOUS at 16:20

## 2018-06-04 NOTE — TELEPHONE ENCOUNTER
----- Message from Cecy Lowe sent at 6/4/2018  9:55 AM EDT -----  Contact: PT  THIS IS DR YANEZ'S PT.  SHE CALLED WANTING TO SPEAK WITH A CMA REGARDING SOME SIDE EFFECTS SHE IS HAVING FROM A MEDICATION.  PLEASE CALL HER [phone].  THANKS

## 2018-06-04 NOTE — ED PROVIDER NOTES
Subjective   History of Present Illness  17-year-old female recently started on metformin 500 mg once daily due to PC OS.  She states that since starting that she's had nausea, vomiting, fatigue, abdominal pain, and her right leg went numb for a few minutes a couple days ago.  She states that the leg hurt and was numb briefly.  She's not had any further troubles with that.  She states that she has been taking her metformin with food.  It doesn't matter if she does that she'll vomit.  She has been able to eat a little bit today.  She denies fevers or chills.  Her abdominal discomfort is generalized.  Review of Systems   All other systems reviewed and are negative.      Past Medical History:   Diagnosis Date   • Amenorrhea due to Depo Provera    • Anemia    • Anxiety    • Asthma    • B12 deficiency    • Depression    • GERD (gastroesophageal reflux disease)    • Pneumonia    • Seasonal allergies        Allergies   Allergen Reactions   • Hepatitis B Virus Vaccine    • Meningococcal Vaccines        Past Surgical History:   Procedure Laterality Date   • ADENOIDECTOMY     • TONSILLECTOMY         Family History   Problem Relation Age of Onset   • Bipolar disorder Maternal Aunt    • Arthritis Maternal Aunt    • Cancer Maternal Uncle         Lung   • Lung cancer Maternal Uncle    • Arthritis Maternal Grandmother    • Heart disease Maternal Grandmother    • Hyperlipidemia Maternal Grandmother    • Hypertension Maternal Grandmother    • Coronary artery disease Maternal Grandmother         CABG   • Cancer Maternal Grandfather         asbestos   • Lung cancer Maternal Grandfather    • No Known Problems Mother    • Arthritis Father    • Depression Brother    • No Known Problems Paternal Aunt    • No Known Problems Paternal Uncle    • No Known Problems Paternal Grandmother    • No Known Problems Paternal Grandfather    • Bipolar disorder Cousin        Social History     Social History   • Marital status: Single     Social History  Main Topics   • Smoking status: Never Smoker   • Smokeless tobacco: Never Used   • Alcohol use No   • Drug use: No   • Sexual activity: Yes     Partners: Male     Birth control/ protection: None      Comment: pt states not preg     Other Topics Concern   • Not on file           Objective   Physical Exam   Constitutional: She is oriented to person, place, and time. She appears well-developed and well-nourished.   HENT:   Head: Normocephalic and atraumatic.   Mouth/Throat: Oropharynx is clear and moist.   Eyes: Conjunctivae and EOM are normal. Pupils are equal, round, and reactive to light.   Neck: Normal range of motion. Neck supple.   Cardiovascular: Regular rhythm, normal heart sounds and intact distal pulses.  Exam reveals no gallop and no friction rub.    No murmur heard.  Mild tachycardia   Pulmonary/Chest: Effort normal and breath sounds normal.   Abdominal: Soft. Bowel sounds are normal.   Denies tenderness.   Musculoskeletal: Normal range of motion.   Neurological: She is alert and oriented to person, place, and time.   Skin: Skin is warm and dry. Capillary refill takes less than 2 seconds.   Psychiatric: She has a normal mood and affect. Her behavior is normal. Judgment and thought content normal.   Nursing note and vitals reviewed.      Procedures           ED Course      Labs are unremarkable.  Patient was given normal saline 1 L IV and Zofran 4 mg IV.  I will discharge her with Zofran she should follow-up with her primary care provider in regard to continuation of the metformin.            [facility]      Final diagnoses:   Non-intractable vomiting with nausea, unspecified vomiting type            Kendy Ramirez, APRN  06/04/18 7098

## 2018-06-05 NOTE — TELEPHONE ENCOUNTER
PT CALLED BACK AND SAID THE METFORMIN IS MAKING HER SICK AND SHE WANTS IT SWITCHED TO SOMETHING ELSE.  THANKS

## 2018-06-11 ENCOUNTER — OFFICE VISIT (OUTPATIENT)
Dept: OBSTETRICS AND GYNECOLOGY | Facility: CLINIC | Age: 18
End: 2018-06-11

## 2018-06-11 VITALS
HEIGHT: 65 IN | BODY MASS INDEX: 42.15 KG/M2 | SYSTOLIC BLOOD PRESSURE: 124 MMHG | DIASTOLIC BLOOD PRESSURE: 80 MMHG | WEIGHT: 253 LBS

## 2018-06-11 DIAGNOSIS — Z87.42 HISTORY OF AMENORRHEA: ICD-10-CM

## 2018-06-11 DIAGNOSIS — R11.0 NAUSEA: Primary | ICD-10-CM

## 2018-06-11 DIAGNOSIS — N92.6 MISSED PERIOD: ICD-10-CM

## 2018-06-11 PROCEDURE — 99213 OFFICE O/P EST LOW 20 MIN: CPT | Performed by: PHYSICIAN ASSISTANT

## 2018-06-11 NOTE — PROGRESS NOTES
Subjective   Chief Complaint   Patient presents with   • Consult     Would like to discuss medication; Metformin is making her really sick       Desi Pace is a 17 y.o. year old  presenting to be seen for complaint of nausea on metformin 500 mg which she has recently started 2 weeks ago  Has been diagnosed with PCOS and started Metformin 500 mg once daily and was supposed to increase to 500 mg bid but states she has a lot of nausea and some diarrhea since starting metformin and only taking once daily with supper. She states she is taking metformin with food  She also states that after taking provera 10 mg for 10 days recently she did not have withdrawal bleed. She has negative urine pregnancy test today.  She is not using contraception and states is OK if conceives though not actively trying.  Her LMP was 2018    Past Medical History:   Diagnosis Date   • Amenorrhea due to Depo Provera    • Anemia    • Anxiety    • Asthma    • B12 deficiency    • Depression    • GERD (gastroesophageal reflux disease)    • Pneumonia    • Seasonal allergies         Current Outpatient Prescriptions:   •  ibuprofen (ADVIL,MOTRIN) 200 MG tablet, Take 200 mg by mouth Every 6 (Six) Hours As Needed for Mild Pain  or Fever., Disp: , Rfl:   •  metFORMIN (GLUCOPHAGE) 500 MG tablet, Take once daily x 7 days then increase to BID, Disp: 60 tablet, Rfl: 11  •  ondansetron ODT (ZOFRAN-ODT) 4 MG disintegrating tablet, Take 1 tablet by mouth Every 6 (Six) Hours As Needed for Nausea or Vomiting., Disp: 20 tablet, Rfl: 0  •  progesterone (PROMETRIUM) 200 MG capsule, 2 capsules at hs for 10 nights, Disp: 20 capsule, Rfl: 0   Allergies   Allergen Reactions   • Hepatitis B Virus Vaccine Allergic reaction to contrast dye   • Meningococcal Vaccines Allergic reaction to contrast dye      Past Surgical History:   Procedure Laterality Date   • ADENOIDECTOMY     • TONSILLECTOMY        Social History     Social History   • Marital status: Single  "    Spouse name: N/A   • Number of children: N/A   • Years of education: N/A     Occupational History   • Not on file.     Social History Main Topics   • Smoking status: Never Smoker   • Smokeless tobacco: Never Used   • Alcohol use No   • Drug use: No   • Sexual activity: Yes     Partners: Male     Birth control/ protection: None      Comment: pt states not preg     Other Topics Concern   • Not on file     Social History Narrative   • No narrative on file      Family History   Problem Relation Age of Onset   • Bipolar disorder Maternal Aunt    • Arthritis Maternal Aunt    • Cancer Maternal Uncle         Lung   • Lung cancer Maternal Uncle    • Arthritis Maternal Grandmother    • Heart disease Maternal Grandmother    • Hyperlipidemia Maternal Grandmother    • Hypertension Maternal Grandmother    • Coronary artery disease Maternal Grandmother         CABG   • Cancer Maternal Grandfather         asbestos   • Lung cancer Maternal Grandfather    • No Known Problems Mother    • Arthritis Father    • Depression Brother    • No Known Problems Paternal Aunt    • No Known Problems Paternal Uncle    • No Known Problems Paternal Grandmother    • No Known Problems Paternal Grandfather    • Bipolar disorder Cousin        Review of Systems   Constitutional: Negative.    Gastrointestinal: Positive for abdominal distention, diarrhea and nausea.   Genitourinary: Positive for menstrual problem.           Objective   /80   Ht 165.1 cm (65\")   Wt 115 kg (253 lb)   Breastfeeding? No   BMI 42.10 kg/m²     Physical Exam         Assessment and Plan  Desi was seen today for consult.    Diagnoses and all orders for this visit:    Nausea    Missed period    History of amenorrhea    Other orders  -     progesterone (PROMETRIUM) 200 MG capsule; 2 capsules at hs for 10 nights      Patient Instructions   Patient counseled regarding nausea and GI side effects being common with metformin. There is not really an alternative medication " however she could consider taking 1/2 dose to see if tolerates better or stopping metformin for 2-3 weeks and resume it. She will think about options.  Also as she did not have withdrawal bleed after provera will challenge with progesterone 400 mg at hs for 10 nights. If does not have bleed within 2 weeks of last dose of progesterone recommend checking FSH and LH.               This note was electronically signed.    Valeri Stahl PA-C   June 12, 2018

## 2018-06-11 NOTE — PATIENT INSTRUCTIONS
Patient counseled regarding nausea and GI side effects being common with metformin. There is not really an alternative medication however she could consider taking 1/2 dose to see if tolerates better or stopping metformin for 2-3 weeks and resume it. She will think about options.  Also as she did not have withdrawal bleed after provera will challenge with progesterone 400 mg at hs for 10 nights. If does not have bleed within 2 weeks of last dose of progesterone recommend checking FSH and LH.

## 2018-06-13 ENCOUNTER — TELEPHONE (OUTPATIENT)
Dept: OBSTETRICS AND GYNECOLOGY | Facility: CLINIC | Age: 18
End: 2018-06-13

## 2018-06-13 NOTE — TELEPHONE ENCOUNTER
Patient was instructed that if she did not start period after progesterone then would check FSH and LH but if she has started period she does not need that done. Thanks

## 2018-06-13 NOTE — TELEPHONE ENCOUNTER
----- Message from Vinicio Bales sent at 6/13/2018  8:41 AM EDT -----  Contact: patient   This is Evelin/Del patient. She called stating she has started her period and was told to come in for an appointment when she starts. She said she needed to have lab work done maybe??

## 2018-09-03 ENCOUNTER — TELEPHONE (OUTPATIENT)
Dept: URGENT CARE | Facility: CLINIC | Age: 18
End: 2018-09-03

## 2018-09-03 NOTE — TELEPHONE ENCOUNTER
----- Message from GENI Armstrong sent at 9/3/2018  8:22 AM EDT -----  Please call the patient regarding her abnormal result.  See if she is improving.  Good coverage on Cipro and complete course.  Thank you

## 2019-01-01 ENCOUNTER — APPOINTMENT (OUTPATIENT)
Dept: CT IMAGING | Facility: HOSPITAL | Age: 19
End: 2019-01-01

## 2019-01-01 ENCOUNTER — HOSPITAL ENCOUNTER (EMERGENCY)
Facility: HOSPITAL | Age: 19
Discharge: HOME | End: 2019-01-02
Attending: EMERGENCY MEDICINE | Admitting: EMERGENCY MEDICINE

## 2019-01-01 DIAGNOSIS — R10.9 ABDOMINAL PAIN, UNSPECIFIED ABDOMINAL LOCATION: Primary | ICD-10-CM

## 2019-01-01 LAB
ALBUMIN SERPL-MCNC: 4.6 G/DL (ref 3.5–5)
ALBUMIN/GLOB SERPL: 1.5 G/DL (ref 1–2)
ALP SERPL-CCNC: 83 U/L (ref 38–126)
ALT SERPL W P-5'-P-CCNC: 55 U/L (ref 13–69)
ANION GAP SERPL CALCULATED.3IONS-SCNC: 13 MMOL/L (ref 10–20)
AST SERPL-CCNC: 38 U/L (ref 15–46)
B-HCG UR QL: NEGATIVE
BASOPHILS # BLD AUTO: 0.03 10*3/MM3 (ref 0–0.2)
BASOPHILS NFR BLD AUTO: 0.3 % (ref 0–2.5)
BILIRUB SERPL-MCNC: 0.3 MG/DL (ref 0.2–1.3)
BILIRUB UR QL STRIP: NEGATIVE
BUN BLD-MCNC: 10 MG/DL (ref 7–20)
BUN/CREAT SERPL: 14.3 (ref 7.1–23.5)
CALCIUM SPEC-SCNC: 9.7 MG/DL (ref 8.4–10.2)
CHLORIDE SERPL-SCNC: 101 MMOL/L (ref 98–107)
CLARITY UR: CLEAR
CO2 SERPL-SCNC: 30 MMOL/L (ref 26–30)
COLOR UR: YELLOW
CREAT BLD-MCNC: 0.7 MG/DL (ref 0.6–1.3)
DEPRECATED RDW RBC AUTO: 39.3 FL (ref 37–54)
EOSINOPHIL # BLD AUTO: 0.1 10*3/MM3 (ref 0–0.7)
EOSINOPHIL NFR BLD AUTO: 1.1 % (ref 0–7)
ERYTHROCYTE [DISTWIDTH] IN BLOOD BY AUTOMATED COUNT: 13.3 % (ref 11.5–14.5)
GFR SERPL CREATININE-BSD FRML MDRD: 107 ML/MIN/1.73
GLOBULIN UR ELPH-MCNC: 3.1 GM/DL
GLUCOSE BLD-MCNC: 98 MG/DL (ref 74–98)
GLUCOSE UR STRIP-MCNC: NEGATIVE MG/DL
HCT VFR BLD AUTO: 39.1 % (ref 37–47)
HGB BLD-MCNC: 12.3 G/DL (ref 12–16)
HGB UR QL STRIP.AUTO: NEGATIVE
IMM GRANULOCYTES # BLD AUTO: 0.02 10*3/MM3 (ref 0–0.06)
IMM GRANULOCYTES NFR BLD AUTO: 0.2 % (ref 0–0.6)
KETONES UR QL STRIP: NEGATIVE
LEUKOCYTE ESTERASE UR QL STRIP.AUTO: NEGATIVE
LIPASE SERPL-CCNC: 30 U/L (ref 23–300)
LYMPHOCYTES # BLD AUTO: 2.94 10*3/MM3 (ref 0.6–3.4)
LYMPHOCYTES NFR BLD AUTO: 31.3 % (ref 10–50)
MCH RBC QN AUTO: 25.7 PG (ref 27–31)
MCHC RBC AUTO-ENTMCNC: 31.5 G/DL (ref 30–37)
MCV RBC AUTO: 81.6 FL (ref 81–99)
MONOCYTES # BLD AUTO: 0.54 10*3/MM3 (ref 0–0.9)
MONOCYTES NFR BLD AUTO: 5.7 % (ref 0–12)
NEUTROPHILS # BLD AUTO: 5.77 10*3/MM3 (ref 2–6.9)
NEUTROPHILS NFR BLD AUTO: 61.4 % (ref 37–80)
NITRITE UR QL STRIP: NEGATIVE
NRBC BLD AUTO-RTO: 0 /100 WBC (ref 0–0)
PH UR STRIP.AUTO: 7.5 [PH] (ref 5–8)
PLATELET # BLD AUTO: 287 10*3/MM3 (ref 130–400)
PMV BLD AUTO: 9 FL (ref 6–12)
POTASSIUM BLD-SCNC: 4 MMOL/L (ref 3.5–5.1)
PROT SERPL-MCNC: 7.7 G/DL (ref 6.3–8.2)
PROT UR QL STRIP: NEGATIVE
RBC # BLD AUTO: 4.79 10*6/MM3 (ref 4.2–5.4)
SODIUM BLD-SCNC: 140 MMOL/L (ref 137–145)
SP GR UR STRIP: 1.02 (ref 1–1.03)
UROBILINOGEN UR QL STRIP: NORMAL
WBC NRBC COR # BLD: 9.4 10*3/MM3 (ref 4.8–10.8)

## 2019-01-01 PROCEDURE — 74177 CT ABD & PELVIS W/CONTRAST: CPT

## 2019-01-01 PROCEDURE — 25010000002 ONDANSETRON PER 1 MG: Performed by: EMERGENCY MEDICINE

## 2019-01-01 PROCEDURE — 96374 THER/PROPH/DIAG INJ IV PUSH: CPT

## 2019-01-01 PROCEDURE — 81025 URINE PREGNANCY TEST: CPT | Performed by: EMERGENCY MEDICINE

## 2019-01-01 PROCEDURE — 25010000002 KETOROLAC TROMETHAMINE PER 15 MG: Performed by: EMERGENCY MEDICINE

## 2019-01-01 PROCEDURE — 99283 EMERGENCY DEPT VISIT LOW MDM: CPT

## 2019-01-01 PROCEDURE — 85025 COMPLETE CBC W/AUTO DIFF WBC: CPT | Performed by: EMERGENCY MEDICINE

## 2019-01-01 PROCEDURE — 96361 HYDRATE IV INFUSION ADD-ON: CPT

## 2019-01-01 PROCEDURE — 80053 COMPREHEN METABOLIC PANEL: CPT | Performed by: EMERGENCY MEDICINE

## 2019-01-01 PROCEDURE — 25010000002 IOPAMIDOL 61 % SOLUTION: Performed by: EMERGENCY MEDICINE

## 2019-01-01 PROCEDURE — 81003 URINALYSIS AUTO W/O SCOPE: CPT | Performed by: EMERGENCY MEDICINE

## 2019-01-01 PROCEDURE — 96375 TX/PRO/DX INJ NEW DRUG ADDON: CPT

## 2019-01-01 PROCEDURE — 83690 ASSAY OF LIPASE: CPT | Performed by: EMERGENCY MEDICINE

## 2019-01-01 RX ORDER — ONDANSETRON 2 MG/ML
4 INJECTION INTRAMUSCULAR; INTRAVENOUS ONCE
Status: COMPLETED | OUTPATIENT
Start: 2019-01-01 | End: 2019-01-01

## 2019-01-01 RX ORDER — KETOROLAC TROMETHAMINE 30 MG/ML
10 INJECTION, SOLUTION INTRAMUSCULAR; INTRAVENOUS ONCE
Status: COMPLETED | OUTPATIENT
Start: 2019-01-01 | End: 2019-01-01

## 2019-01-01 RX ADMIN — ONDANSETRON 4 MG: 2 INJECTION INTRAMUSCULAR; INTRAVENOUS at 22:16

## 2019-01-01 RX ADMIN — KETOROLAC TROMETHAMINE 10 MG: 30 INJECTION, SOLUTION INTRAMUSCULAR at 22:16

## 2019-01-01 RX ADMIN — IOPAMIDOL 100 ML: 612 INJECTION, SOLUTION INTRAVENOUS at 23:22

## 2019-01-01 RX ADMIN — SODIUM CHLORIDE 1000 ML: 9 INJECTION, SOLUTION INTRAVENOUS at 22:14

## 2019-01-02 VITALS
HEART RATE: 86 BPM | DIASTOLIC BLOOD PRESSURE: 75 MMHG | SYSTOLIC BLOOD PRESSURE: 137 MMHG | HEIGHT: 65 IN | BODY MASS INDEX: 42.35 KG/M2 | OXYGEN SATURATION: 100 % | WEIGHT: 254.2 LBS | TEMPERATURE: 98.5 F | RESPIRATION RATE: 20 BRPM

## 2019-01-02 RX ORDER — ONDANSETRON 4 MG/1
4 TABLET, ORALLY DISINTEGRATING ORAL EVERY 8 HOURS PRN
Qty: 10 TABLET | Refills: 0 | Status: SHIPPED | OUTPATIENT
Start: 2019-01-02 | End: 2019-05-21

## 2019-01-02 RX ORDER — NAPROXEN 500 MG/1
500 TABLET ORAL 2 TIMES DAILY PRN
Qty: 14 TABLET | Refills: 0 | Status: SHIPPED | OUTPATIENT
Start: 2019-01-02 | End: 2022-11-15

## 2019-01-02 NOTE — ED PROVIDER NOTES
TRIAGE CHIEF COMPLAINT:     Nursing and triage notes reviewed    Chief Complaint   Patient presents with   • Abdominal Pain      HPI: Desi Pace is a 18 y.o. female who presents to the emergency department complaining of right lower quadrant abdominal pain.  Symptoms began approximately 3 hours previously.  Patient describes sudden onset of sharp pain.  Patient has had some nausea with it.  She has a history of polycystic ovary disease and states she's had cysts before and it feels somewhat similar.  She denies having a fever or chills.  Still has her appendix.  No diarrhea.     REVIEW OF SYSTEMS: All other systems reviewed and are negative     PAST MEDICAL HISTORY:   Past Medical History:   Diagnosis Date   • Amenorrhea due to Depo Provera    • Anemia    • Anxiety    • Asthma    • B12 deficiency    • Depression    • Diabetes mellitus (CMS/HCC)    • GERD (gastroesophageal reflux disease)    • Pneumonia    • Seasonal allergies         FAMILY HISTORY:   Family History   Problem Relation Age of Onset   • Bipolar disorder Maternal Aunt    • Arthritis Maternal Aunt    • Cancer Maternal Uncle         Lung   • Lung cancer Maternal Uncle    • Arthritis Maternal Grandmother    • Heart disease Maternal Grandmother    • Hyperlipidemia Maternal Grandmother    • Hypertension Maternal Grandmother    • Coronary artery disease Maternal Grandmother         CABG   • Cancer Maternal Grandfather         asbestos   • Lung cancer Maternal Grandfather    • No Known Problems Mother    • Arthritis Father    • Depression Brother    • No Known Problems Paternal Aunt    • No Known Problems Paternal Uncle    • No Known Problems Paternal Grandmother    • No Known Problems Paternal Grandfather    • Bipolar disorder Cousin         SOCIAL HISTORY:   Social History     Socioeconomic History   • Marital status:      Spouse name: Not on file   • Number of children: Not on file   • Years of education: Not on file   • Highest education  level: Not on file   Social Needs   • Financial resource strain: Not on file   • Food insecurity - worry: Not on file   • Food insecurity - inability: Not on file   • Transportation needs - medical: Not on file   • Transportation needs - non-medical: Not on file   Occupational History   • Not on file   Tobacco Use   • Smoking status: Never Smoker   • Smokeless tobacco: Never Used   Substance and Sexual Activity   • Alcohol use: No   • Drug use: No   • Sexual activity: Yes     Partners: Male     Birth control/protection: None     Comment: pt states not preg   Other Topics Concern   • Not on file   Social History Narrative   • Not on file        SURGICAL HISTORY:   Past Surgical History:   Procedure Laterality Date   • ADENOIDECTOMY     • TONSILLECTOMY          CURRENT MEDICATIONS:      Medication List      ASK your doctor about these medications    azithromycin 250 MG tablet  Commonly known as:  ZITHROMAX  Take 2 tablets the first day, then 1 tablet daily for 4 days.     ibuprofen 200 MG tablet  Commonly known as:  ADVIL,MOTRIN     * metFORMIN  MG 24 hr tablet  Commonly known as:  GLUCOPHAGE-XR     * metFORMIN 500 MG tablet  Commonly known as:  GLUCOPHAGE  Take once daily x 7 days then increase to BID     ondansetron ODT 4 MG disintegrating tablet  Commonly known as:  ZOFRAN-ODT  Take 1 tablet by mouth Every 8 (Eight) Hours As Needed for Nausea or   Vomiting.     phenazopyridine 200 MG tablet  Commonly known as:  PYRIDIUM  Take 1 tablet by mouth 3 (Three) Times a Day As Needed for bladder spasms.     progesterone 200 MG capsule  Commonly known as:  PROMETRIUM  2 capsules at hs for 10 nights         * This list has 2 medication(s) that are the same as other medications   prescribed for you. Read the directions carefully, and ask your doctor or   other care provider to review them with you.               ALLERGIES: Hepatitis b virus vaccine and Meningococcal vaccines     PHYSICAL EXAM:   VITAL SIGNS:   Vitals:     01/01/19 2106   BP: 147/93   Pulse: 86   Resp: 20   Temp: 98.7 °F (37.1 °C)   SpO2: 100%      CONSTITUTIONAL: Awake, oriented, appears non-toxic   HENT: Atraumatic, normocephalic, oral mucosa pink and moist, airway patent.  EYES: Conjunctiva clear   NECK: Trachea midline   CARDIOVASCULAR: Normal heart rate, Normal rhythm, No murmurs, rubs, gallops   PULMONARY/CHEST: Clear to auscultation, no rhonchi, wheezes, or rales. Symmetrical breath sounds   ABDOMINAL: Non-distended, soft, moderate right lower quadrant tenderness- no rebound or guarding  NEUROLOGIC: Non-focal, moving all four extremities, no gross sensory or motor deficits.   EXTREMITIES: No clubbing, cyanosis, or edema   SKIN: Warm, Dry, No erythema, No rash     ED COURSE / MEDICAL DECISION MAKING:   Desi Pace is a 18 y.o. female who presents to the emergency department for evaluation of abdominal pain.  Patient appears mildly uncomfortable on arrival in the emergency department.  Physical exam on arrival does reveal moderate right lower quadrant abdominal tenderness.  Differential would include a ovarian cyst, torsion, appendicitis.  We'll obtain labs and imaging for further evaluation of patient's symptoms.  Laboratory tests are largely unremarkable.  CT scan reveals some enlarged lymph nodes but otherwise is unremarkable.  Patient improved after symptomatic treatment.  We'll continue to treat symptomatically with return precautions.    DECISION TO DISCHARGE/ADMIT: see ED care timeline     FINAL IMPRESSION:   1 -- abdominal pain   2 --   3 --     Electronically signed by: Danay Orellana MD, 1/1/2019 9:59 PM       Danay Orellana MD  01/02/19 0021

## 2019-03-28 ENCOUNTER — HOSPITAL ENCOUNTER (EMERGENCY)
Facility: HOSPITAL | Age: 19
Discharge: HOME | End: 2019-03-29
Attending: EMERGENCY MEDICINE | Admitting: EMERGENCY MEDICINE

## 2019-03-28 DIAGNOSIS — Z20.2 STD EXPOSURE: Primary | ICD-10-CM

## 2019-03-28 PROCEDURE — 87491 CHLMYD TRACH DNA AMP PROBE: CPT | Performed by: NURSE PRACTITIONER

## 2019-03-28 PROCEDURE — 81001 URINALYSIS AUTO W/SCOPE: CPT | Performed by: NURSE PRACTITIONER

## 2019-03-28 PROCEDURE — 99284 EMERGENCY DEPT VISIT MOD MDM: CPT

## 2019-03-28 PROCEDURE — 81025 URINE PREGNANCY TEST: CPT | Performed by: NURSE PRACTITIONER

## 2019-03-28 PROCEDURE — 87591 N.GONORRHOEAE DNA AMP PROB: CPT | Performed by: NURSE PRACTITIONER

## 2019-03-28 RX ORDER — AZITHROMYCIN 250 MG/1
1000 TABLET, FILM COATED ORAL ONCE
Status: COMPLETED | OUTPATIENT
Start: 2019-03-28 | End: 2019-03-29

## 2019-03-28 RX ORDER — CEFTRIAXONE SODIUM 250 MG/1
250 INJECTION, POWDER, FOR SOLUTION INTRAMUSCULAR; INTRAVENOUS ONCE
Status: COMPLETED | OUTPATIENT
Start: 2019-03-28 | End: 2019-03-29

## 2019-03-28 RX ORDER — LIDOCAINE HYDROCHLORIDE 10 MG/ML
0.9 INJECTION, SOLUTION EPIDURAL; INFILTRATION; INTRACAUDAL; PERINEURAL ONCE
Status: COMPLETED | OUTPATIENT
Start: 2019-03-28 | End: 2019-03-29

## 2019-03-29 VITALS
OXYGEN SATURATION: 100 % | DIASTOLIC BLOOD PRESSURE: 83 MMHG | BODY MASS INDEX: 40.82 KG/M2 | WEIGHT: 245 LBS | SYSTOLIC BLOOD PRESSURE: 122 MMHG | TEMPERATURE: 98.3 F | RESPIRATION RATE: 16 BRPM | HEART RATE: 91 BPM | HEIGHT: 65 IN

## 2019-03-29 LAB
B-HCG UR QL: NEGATIVE
BACTERIA UR QL AUTO: ABNORMAL /HPF
BILIRUB UR QL STRIP: NEGATIVE
CLARITY UR: CLEAR
COLOR UR: YELLOW
GLUCOSE UR STRIP-MCNC: NEGATIVE MG/DL
HGB UR QL STRIP.AUTO: ABNORMAL
HYALINE CASTS UR QL AUTO: ABNORMAL /LPF
INTERNAL NEGATIVE CONTROL: NEGATIVE
INTERNAL POSITIVE CONTROL: POSITIVE
KETONES UR QL STRIP: ABNORMAL
LEUKOCYTE ESTERASE UR QL STRIP.AUTO: NEGATIVE
Lab: NORMAL
NITRITE UR QL STRIP: NEGATIVE
PH UR STRIP.AUTO: <=5 [PH] (ref 5–8)
PROT UR QL STRIP: NEGATIVE
RBC # UR: ABNORMAL /HPF
REF LAB TEST METHOD: ABNORMAL
SP GR UR STRIP: 1.03 (ref 1–1.03)
SQUAMOUS #/AREA URNS HPF: ABNORMAL /HPF
UROBILINOGEN UR QL STRIP: ABNORMAL
WBC UR QL AUTO: ABNORMAL /HPF

## 2019-03-29 PROCEDURE — 25010000002 CEFTRIAXONE PER 250 MG: Performed by: NURSE PRACTITIONER

## 2019-03-29 PROCEDURE — 96372 THER/PROPH/DIAG INJ SC/IM: CPT

## 2019-03-29 RX ADMIN — LIDOCAINE HYDROCHLORIDE 0.9 ML: 10 INJECTION, SOLUTION EPIDURAL; INFILTRATION; INTRACAUDAL; PERINEURAL at 01:52

## 2019-03-29 RX ADMIN — AZITHROMYCIN 1000 MG: 250 TABLET, FILM COATED ORAL at 01:52

## 2019-03-29 RX ADMIN — CEFTRIAXONE SODIUM 250 MG: 250 INJECTION, POWDER, FOR SOLUTION INTRAMUSCULAR; INTRAVENOUS at 01:51

## 2019-04-01 LAB
C TRACH RRNA SPEC DONR QL NAA+PROBE: NEGATIVE
N GONORRHOEA DNA SPEC QL NAA+PROBE: NEGATIVE

## 2019-05-21 ENCOUNTER — OFFICE VISIT (OUTPATIENT)
Dept: RETAIL CLINIC | Facility: CLINIC | Age: 19
End: 2019-05-21

## 2019-05-21 VITALS
TEMPERATURE: 98.4 F | OXYGEN SATURATION: 99 % | HEART RATE: 88 BPM | BODY MASS INDEX: 41.32 KG/M2 | RESPIRATION RATE: 20 BRPM | HEIGHT: 65 IN | WEIGHT: 248 LBS

## 2019-05-21 DIAGNOSIS — N30.01 ACUTE CYSTITIS WITH HEMATURIA: Primary | ICD-10-CM

## 2019-05-21 LAB
BILIRUB BLD-MCNC: NEGATIVE MG/DL
CLARITY, POC: ABNORMAL
COLOR UR: ABNORMAL
GLUCOSE UR STRIP-MCNC: NEGATIVE MG/DL
KETONES UR QL: NEGATIVE
LEUKOCYTE EST, POC: ABNORMAL
NITRITE UR-MCNC: NEGATIVE MG/ML
PH UR: 7 [PH] (ref 5–8)
PROT UR STRIP-MCNC: ABNORMAL MG/DL
RBC # UR STRIP: ABNORMAL /UL
SP GR UR: 1.02 (ref 1–1.03)
UROBILINOGEN UR QL: NORMAL

## 2019-05-21 PROCEDURE — 99213 OFFICE O/P EST LOW 20 MIN: CPT | Performed by: NURSE PRACTITIONER

## 2019-05-21 PROCEDURE — 81002 URINALYSIS NONAUTO W/O SCOPE: CPT | Performed by: NURSE PRACTITIONER

## 2019-05-21 RX ORDER — NITROFURANTOIN 25; 75 MG/1; MG/1
100 CAPSULE ORAL 2 TIMES DAILY
Qty: 14 CAPSULE | Refills: 0 | Status: SHIPPED | OUTPATIENT
Start: 2019-05-21 | End: 2022-11-15

## 2019-05-21 RX ORDER — PHENAZOPYRIDINE HYDROCHLORIDE 200 MG/1
200 TABLET, FILM COATED ORAL 3 TIMES DAILY PRN
Qty: 6 TABLET | Refills: 0 | Status: SHIPPED | OUTPATIENT
Start: 2019-05-21 | End: 2022-11-15

## 2019-05-21 NOTE — PROGRESS NOTES
Subjective   Lila De Paz is a 18 y.o. female.   Chief Complaint   Patient presents with   • Difficulty Urinating      17 yo w/f presents with complaint of urinary urgency, frequency, and some burning.  She reports taking no medication for symptoms and taking no medication prior to arrival at clinic today.  Refer to HPI/ROS for additional information.      Difficulty Urinating   This is a new problem. The current episode started in the past 7 days. The problem has been gradually worsening. Associated symptoms include abdominal pain and urinary symptoms. The symptoms are aggravated by exertion. She has tried nothing for the symptoms.        The following portions of the patient's history were reviewed and updated as appropriate: allergies, current medications, past family history, past medical history, past social history, past surgical history and problem list.    Current Outpatient Medications:   •  ibuprofen (ADVIL,MOTRIN) 200 MG tablet, Take 200 mg by mouth Every 6 (Six) Hours As Needed for Mild Pain  or Fever., Disp: , Rfl:   •  naproxen (NAPROSYN) 500 MG tablet, Take 1 tablet by mouth 2 (Two) Times a Day As Needed for Moderate Pain ., Disp: 14 tablet, Rfl: 0  •  metFORMIN (GLUCOPHAGE) 500 MG tablet, Take once daily x 7 days then increase to BID, Disp: 60 tablet, Rfl: 11  •  metFORMIN ER (GLUCOPHAGE-XR) 500 MG 24 hr tablet, Take 500 mg by mouth Daily With Breakfast., Disp: , Rfl:   •  nitrofurantoin, macrocrystal-monohydrate, (MACROBID) 100 MG capsule, Take 1 capsule by mouth 2 (Two) Times a Day., Disp: 14 capsule, Rfl: 0  •  phenazopyridine (PYRIDIUM) 200 MG tablet, Take 1 tablet by mouth 3 (Three) Times a Day As Needed for bladder spasms., Disp: 6 tablet, Rfl: 0    Review of Systems   Constitutional: Positive for activity change.   Respiratory: Negative.    Cardiovascular: Negative.    Gastrointestinal: Positive for abdominal pain.   Genitourinary: Positive for difficulty urinating, frequency, hematuria  "and urgency.   Skin: Negative.    Psychiatric/Behavioral: Negative.      Pulse 88   Temp 98.4 °F (36.9 °C) (Oral)   Resp 20   Ht 165.1 cm (65\")   Wt 112 kg (248 lb)   SpO2 99%   BMI 41.27 kg/m²     Objective   Allergies   Allergen Reactions   • Hepatitis B Virus Vaccine Other (See Comments)     Cellulitus   • Meningococcal And Meningococcal Protein Conjugated  Vaccines Unknown (See Comments)     Cellulitus       Physical Exam   Constitutional: She is oriented to person, place, and time. She appears well-developed and well-nourished. No distress.   Eyes: Conjunctivae are normal.   Neck: Normal range of motion. Neck supple. No JVD present. No tracheal deviation present. No thyromegaly present.   Cardiovascular: Normal rate, regular rhythm and normal heart sounds.   Pulmonary/Chest: Effort normal and breath sounds normal.   Abdominal: Soft. Bowel sounds are normal. She exhibits no distension and no mass. There is tenderness in the suprapubic area. There is no rebound and no guarding. No hernia.   Lymphadenopathy:     She has no cervical adenopathy.   Neurological: She is alert and oriented to person, place, and time.   Skin: Skin is warm. Capillary refill takes less than 2 seconds. She is not diaphoretic.   Psychiatric: She has a normal mood and affect. Her behavior is normal.   Vitals reviewed.      Assessment/Plan   Lila was seen today for difficulty urinating.    Diagnoses and all orders for this visit:    Acute cystitis with hematuria  -     POC Urinalysis Dipstick    Other orders  -     nitrofurantoin, macrocrystal-monohydrate, (MACROBID) 100 MG capsule; Take 1 capsule by mouth 2 (Two) Times a Day.  -     phenazopyridine (PYRIDIUM) 200 MG tablet; Take 1 tablet by mouth 3 (Three) Times a Day As Needed for bladder spasms.      Results for orders placed or performed in visit on 05/21/19   POC Urinalysis Dipstick   Result Value Ref Range    Color Dark Yellow Yellow, Straw, Dark Yellow, Lara    Clarity, UA " Slightly Cloudy (A) Clear    Glucose, UA Negative Negative, 1000 mg/dL (3+) mg/dL    Bilirubin Negative Negative    Ketones, UA Negative Negative    Specific Gravity  1.020 1.005 - 1.030    Blood, UA Large (A) Negative    pH, Urine 7.0 5.0 - 8.0    Protein,  mg/dL (A) Negative mg/dL    Urobilinogen, UA Normal Normal    Leukocytes Small (1+) (A) Negative    Nitrite, UA Negative Negative           An After Visit Summary was printed, reviewed, and given to the patient. Understanding verbalized and agrees with treatment plan.  If no improvement or becomes worse, follow up with primary or go to [facility].        May 21, 2019 3:16 PM

## 2019-05-21 NOTE — PATIENT INSTRUCTIONS
Urinary Tract Infection, Adult  A urinary tract infection (UTI) is an infection of any part of the urinary tract. The urinary tract includes the:  · Kidneys.  · Ureters.  · Bladder.  · Urethra.    These organs make, store, and get rid of pee (urine) in the body.  Follow these instructions at home:  · Take over-the-counter and prescription medicines only as told by your doctor.  · If you were prescribed an antibiotic medicine, take it as told by your doctor. Do not stop taking the antibiotic even if you start to feel better.  · Avoid the following drinks:  ? Alcohol.  ? Caffeine.  ? Tea.  ? Carbonated drinks.  · Drink enough fluid to keep your pee clear or pale yellow.  · Keep all follow-up visits as told by your doctor. This is important.  · Make sure to:  ? Empty your bladder often and completely. Do not to hold pee for long periods of time.  ? Empty your bladder before and after sex.  ? Wipe from front to back after a bowel movement if you are female. Use each tissue one time when you wipe.  Contact a doctor if:  · You have back pain.  · You have a fever.  · You feel sick to your stomach (nauseous).  · You throw up (vomit).  · Your symptoms do not get better after 3 days.  · Your symptoms go away and then come back.  Get help right away if:  · You have very bad back pain.  · You have very bad lower belly (abdominal) pain.  · You are throwing up and cannot keep down any medicines or water.  This information is not intended to replace advice given to you by your health care provider. Make sure you discuss any questions you have with your health care provider.  Document Released: 06/05/2009 Document Revised: 06/12/2018 Document Reviewed: 11/07/2016  [employer] Patient Education © 2019 [employer]

## 2022-03-05 ENCOUNTER — HOSPITAL ENCOUNTER (EMERGENCY)
Facility: HOSPITAL | Age: 22
Discharge: HOME | End: 2022-03-05
Attending: EMERGENCY MEDICINE | Admitting: EMERGENCY MEDICINE

## 2022-03-05 ENCOUNTER — APPOINTMENT (OUTPATIENT)
Dept: CT IMAGING | Facility: HOSPITAL | Age: 22
End: 2022-03-05

## 2022-03-05 VITALS
TEMPERATURE: 98 F | WEIGHT: 250 LBS | RESPIRATION RATE: 16 BRPM | OXYGEN SATURATION: 99 % | DIASTOLIC BLOOD PRESSURE: 97 MMHG | SYSTOLIC BLOOD PRESSURE: 142 MMHG | BODY MASS INDEX: 41.65 KG/M2 | HEIGHT: 65 IN | HEART RATE: 106 BPM

## 2022-03-05 DIAGNOSIS — N93.9 VAGINAL BLEEDING: Primary | ICD-10-CM

## 2022-03-05 DIAGNOSIS — R10.9 ABDOMINAL CRAMPING: ICD-10-CM

## 2022-03-05 LAB
ABO GROUP BLD: NORMAL
B-HCG UR QL: NEGATIVE
BACTERIA UR QL AUTO: ABNORMAL /HPF
BASOPHILS # BLD AUTO: 0.03 10*3/MM3 (ref 0–0.2)
BASOPHILS NFR BLD AUTO: 0.5 % (ref 0–1.5)
BILIRUB UR QL STRIP: NEGATIVE
CLARITY UR: ABNORMAL
COLOR UR: YELLOW
DEPRECATED RDW RBC AUTO: 39.7 FL (ref 37–54)
EOSINOPHIL # BLD AUTO: 0.1 10*3/MM3 (ref 0–0.4)
EOSINOPHIL NFR BLD AUTO: 1.5 % (ref 0.3–6.2)
ERYTHROCYTE [DISTWIDTH] IN BLOOD BY AUTOMATED COUNT: 14.1 % (ref 12.3–15.4)
EXPIRATION DATE: NORMAL
GLUCOSE UR STRIP-MCNC: NEGATIVE MG/DL
HCG INTACT+B SERPL-ACNC: <0.1 MIU/ML
HCT VFR BLD AUTO: 36.9 % (ref 34–46.6)
HGB BLD-MCNC: 11.2 G/DL (ref 12–15.9)
HGB UR QL STRIP.AUTO: ABNORMAL
HOLD SPECIMEN: NORMAL
HYALINE CASTS UR QL AUTO: ABNORMAL /LPF
IMM GRANULOCYTES # BLD AUTO: 0.03 10*3/MM3 (ref 0–0.05)
IMM GRANULOCYTES NFR BLD AUTO: 0.5 % (ref 0–0.5)
INTERNAL NEGATIVE CONTROL: NORMAL
INTERNAL POSITIVE CONTROL: NORMAL
KETONES UR QL STRIP: ABNORMAL
LEUKOCYTE ESTERASE UR QL STRIP.AUTO: ABNORMAL
LYMPHOCYTES # BLD AUTO: 1.18 10*3/MM3 (ref 0.7–3.1)
LYMPHOCYTES NFR BLD AUTO: 17.8 % (ref 19.6–45.3)
Lab: NORMAL
MCH RBC QN AUTO: 23.5 PG (ref 26.6–33)
MCHC RBC AUTO-ENTMCNC: 30.4 G/DL (ref 31.5–35.7)
MCV RBC AUTO: 77.4 FL (ref 79–97)
MONOCYTES # BLD AUTO: 0.4 10*3/MM3 (ref 0.1–0.9)
MONOCYTES NFR BLD AUTO: 6 % (ref 5–12)
MUCOUS THREADS URNS QL MICRO: ABNORMAL /HPF
NEUTROPHILS NFR BLD AUTO: 4.9 10*3/MM3 (ref 1.7–7)
NEUTROPHILS NFR BLD AUTO: 73.7 % (ref 42.7–76)
NITRITE UR QL STRIP: NEGATIVE
NRBC BLD AUTO-RTO: 0 /100 WBC (ref 0–0.2)
NUMBER OF DOSES: NORMAL
PH UR STRIP.AUTO: <=5 [PH] (ref 5–8)
PLATELET # BLD AUTO: 265 10*3/MM3 (ref 140–450)
PMV BLD AUTO: 9.2 FL (ref 6–12)
PROT UR QL STRIP: ABNORMAL
RBC # BLD AUTO: 4.77 10*6/MM3 (ref 3.77–5.28)
RBC # UR STRIP: ABNORMAL /HPF
REF LAB TEST METHOD: ABNORMAL
RH BLD: POSITIVE
SP GR UR STRIP: 1.03 (ref 1–1.03)
SQUAMOUS #/AREA URNS HPF: ABNORMAL /HPF
UROBILINOGEN UR QL STRIP: ABNORMAL
WBC # UR STRIP: ABNORMAL /HPF
WBC NRBC COR # BLD: 6.64 10*3/MM3 (ref 3.4–10.8)
WHOLE BLOOD HOLD SPECIMEN: NORMAL
WHOLE BLOOD HOLD SPECIMEN: NORMAL

## 2022-03-05 PROCEDURE — 81001 URINALYSIS AUTO W/SCOPE: CPT | Performed by: EMERGENCY MEDICINE

## 2022-03-05 PROCEDURE — 85025 COMPLETE CBC W/AUTO DIFF WBC: CPT | Performed by: EMERGENCY MEDICINE

## 2022-03-05 PROCEDURE — 84702 CHORIONIC GONADOTROPIN TEST: CPT | Performed by: EMERGENCY MEDICINE

## 2022-03-05 PROCEDURE — 99283 EMERGENCY DEPT VISIT LOW MDM: CPT

## 2022-03-05 PROCEDURE — 81025 URINE PREGNANCY TEST: CPT | Performed by: EMERGENCY MEDICINE

## 2022-03-05 PROCEDURE — 81025 URINE PREGNANCY TEST: CPT

## 2022-03-05 PROCEDURE — 86900 BLOOD TYPING SEROLOGIC ABO: CPT | Performed by: EMERGENCY MEDICINE

## 2022-03-05 PROCEDURE — 86901 BLOOD TYPING SEROLOGIC RH(D): CPT | Performed by: EMERGENCY MEDICINE

## 2022-03-05 PROCEDURE — 74176 CT ABD & PELVIS W/O CONTRAST: CPT

## 2022-03-05 RX ORDER — SODIUM CHLORIDE 0.9 % (FLUSH) 0.9 %
10 SYRINGE (ML) INJECTION AS NEEDED
Status: DISCONTINUED | OUTPATIENT
Start: 2022-03-05 | End: 2022-03-05 | Stop reason: HOSPADM

## 2022-03-05 RX ADMIN — SODIUM CHLORIDE 1000 ML: 9 INJECTION, SOLUTION INTRAVENOUS at 10:38

## 2022-03-05 NOTE — DISCHARGE INSTRUCTIONS
Make sure the rest and drink plenty of fluids.    Take Tylenol or Ibuprofen as needed for pain.    Follow-up with primary care physician for outpatient recheck

## 2022-03-05 NOTE — EXTERNAL PATIENT INSTRUCTIONS
Patient Education   Table of Contents       Dysfunctional Uterine Bleeding     To view videos and all your education online visit,   [email or web address]   or scan this QR code with your smartphone.                  Dysfunctional Uterine Bleeding     Dysfunctional uterine bleeding is abnormal bleeding from the uterus. Dysfunctional uterine bleeding includes:       A menstrual period that comes earlier or later than usual.       A menstrual period that is lighter or heavier than usual, or has large blood clots.       Vaginal bleeding between menstrual periods.       Skipping one or more menstrual periods.       Vaginal bleeding after sex.       Vaginal bleeding after menopause.     Follow these instructions at home:   Eating and drinking            Eat well-balanced meals. Include foods that are high in iron, such as liver, meat, shellfish, green leafy vegetables, and eggs.      To prevent or treat constipation, your health care provider may recommend that you:       Drink enough fluid to keep your urine pale yellow.       Take over-the-counter or prescription medicines.       Eat foods that are high in fiber, such as beans, whole grains, and fresh fruits and vegetables.       Limit foods that are high in fat and processed sugars, such as fried or sweet foods.     Medicines         Take over-the-counter and prescription medicines only as told by your health care provider.      Do not  change medicines without talking with your health care provider.      Aspirin or medicines that contain aspirin may make the bleeding worse. Do not  take those medicines:       During the week before your menstrual period.       During your menstrual period.       If you were prescribed iron pills, take them as told by your health care provider. Iron pills help to replace iron that your body loses because of this condition.     Activity        If you need to change your sanitary pad or tampon more than one time every 2 hours:        Lie in bed with your feet raised (elevated).       Place a cold pack on your lower abdomen.       Rest as much as possible until the bleeding stops or slows down.      Do not  try to lose weight until the bleeding has stopped and your blood iron level is back to normal.     General instructions           For two months, write down:       When your menstrual period starts.       When your menstrual period ends.       When any abnormal vaginal bleeding occurs.       What problems you notice.       Keep all follow up visits as told by your health care provider. This is important.         Contact a health care provider if you:         Feel light-headed or weak.       Have nausea and vomiting.       Cannot eat or drink without vomiting.       Feel dizzy or have diarrhea while you are taking medicines.       Are taking birth control pills or hormones, and you want to change them or stop taking them.     Get help right away if:         You develop a fever or chills.       You need to change your sanitary pad or tampon more than one time per hour.       Your vaginal bleeding becomes heavier, or your flow contains clots more often.       You develop pain in your abdomen.       You lose consciousness.       You develop a rash.     Summary         Dysfunctional uterine bleeding is abnormal bleeding from the uterus.       It includes menstrual bleeding of abnormal duration, volume, or regularity.       Bleeding after sex and after menopause are also considered dysfunctional uterine bleeding.     This information is not intended to replace advice given to you by your health care provider. Make sure you discuss any questions you have with your health care provider.     Document Released: 12/15/2001Document Revised: 05/29/2019Document Reviewed: 05/29/2019     [employer] Patient Education ? 2021 [employer]

## 2022-03-05 NOTE — ED PROVIDER NOTES
Subjective   21-year-old female who presents for evaluation of crampy abdominal pain and vaginal bleeding.  The patient reports that she began to have crampy abdominal pain 2 to 3 days ago.  She reports that the vaginal bleeding has been only this morning.  The bleeding was only faint.  She reports that her last period was approxi-5 weeks ago.  She typically reports normal/regular periods.  She does however report a known history of PCOS.  She reports taking a pregnancy test yesterday and the day and felt like there was a faint positive line present.  No previous history of pregnancy.  No previous surgical intervention of the abdomen.  No significant vomiting but has reported nausea.  No chest pain, cough, shortness of breath.  No body aches, or chills.  No reported change in bowel function, no acute urinary symptoms include no dysuria, frequency, or urgency.  She has not taken any medications prior to arrival.          Review of Systems   Constitutional: Negative for chills, fatigue and fever.   HENT: Negative for congestion, ear pain, postnasal drip, sinus pressure and sore throat.    Eyes: Negative for pain, redness and visual disturbance.   Respiratory: Negative for cough, chest tightness and shortness of breath.    Cardiovascular: Negative for chest pain, palpitations and leg swelling.   Gastrointestinal: Positive for abdominal pain and nausea. Negative for anal bleeding, blood in stool, diarrhea and vomiting.   Endocrine: Negative for polydipsia and polyuria.   Genitourinary: Positive for vaginal bleeding. Negative for difficulty urinating, dysuria, frequency and urgency.   Musculoskeletal: Negative for arthralgias, back pain and neck pain.   Skin: Negative for pallor and rash.   Allergic/Immunologic: Negative for environmental allergies and immunocompromised state.   Neurological: Negative for dizziness, weakness and headaches.   Hematological: Negative for adenopathy.   Psychiatric/Behavioral: Negative for  confusion, self-injury and suicidal ideas. The patient is not nervous/anxious.    All other systems reviewed and are negative.      Past Medical History:   Diagnosis Date   • Amenorrhea due to Depo Provera    • Anemia    • Anxiety    • Asthma    • B12 deficiency    • Depression    • Diabetes mellitus (HCC)    • GERD (gastroesophageal reflux disease)    • Pneumonia    • Seasonal allergies        Allergies   Allergen Reactions   • Hepatitis B Virus Vaccines Other (See Comments)     Cellulitus   • Meningococcal And Conjugated Vaccines Unknown (See Comments)     Cellulitus       Past Surgical History:   Procedure Laterality Date   • ADENOIDECTOMY     • TONSILLECTOMY         Family History   Problem Relation Age of Onset   • Bipolar disorder Maternal Aunt    • Arthritis Maternal Aunt    • Cancer Maternal Uncle         Lung   • Lung cancer Maternal Uncle    • Arthritis Maternal Grandmother    • Heart disease Maternal Grandmother    • Hyperlipidemia Maternal Grandmother    • Hypertension Maternal Grandmother    • Coronary artery disease Maternal Grandmother         CABG   • Cancer Maternal Grandfather         asbestos   • Lung cancer Maternal Grandfather    • No Known Problems Mother    • Arthritis Father    • Depression Brother    • No Known Problems Paternal Aunt    • No Known Problems Paternal Uncle    • No Known Problems Paternal Grandmother    • No Known Problems Paternal Grandfather    • Bipolar disorder Cousin        Social History     Socioeconomic History   • Marital status:    Tobacco Use   • Smoking status: Never Smoker   • Smokeless tobacco: Never Used   Substance and Sexual Activity   • Alcohol use: No   • Drug use: No   • Sexual activity: Yes     Partners: Male     Birth control/protection: None     Comment: pt states not preg           Objective   Physical Exam  Vitals and nursing note reviewed.   Constitutional:       General: She is not in acute distress.     Appearance: Normal appearance. She is  well-developed. She is not toxic-appearing or diaphoretic.   HENT:      Head: Normocephalic and atraumatic.      Right Ear: External ear normal.      Left Ear: External ear normal.      Nose: Nose normal.   Eyes:      General: Lids are normal.      Pupils: Pupils are equal, round, and reactive to light.   Neck:      Trachea: No tracheal deviation.   Cardiovascular:      Rate and Rhythm: Regular rhythm. Tachycardia present.      Pulses: No decreased pulses.      Heart sounds: Normal heart sounds. No murmur heard.    No friction rub. No gallop.   Pulmonary:      Effort: Pulmonary effort is normal. No respiratory distress.      Breath sounds: Normal breath sounds. No decreased breath sounds, wheezing, rhonchi or rales.   Abdominal:      General: Bowel sounds are normal.      Palpations: Abdomen is soft.      Tenderness: There is generalized abdominal tenderness. There is no guarding or rebound.       Musculoskeletal:         General: No deformity. Normal range of motion.      Cervical back: Normal range of motion and neck supple.   Lymphadenopathy:      Cervical: No cervical adenopathy.   Skin:     General: Skin is warm and dry.      Findings: No rash.   Neurological:      Mental Status: She is alert and oriented to person, place, and time.      Cranial Nerves: No cranial nerve deficit.      Sensory: No sensory deficit.   Psychiatric:         Speech: Speech normal.         Behavior: Behavior normal.         Thought Content: Thought content normal.         Judgment: Judgment normal.         Procedures           ED Course                                                 MDM  Number of Diagnoses or Management Options  Abdominal cramping: new and requires workup  Vaginal bleeding: new and requires workup  Diagnosis management comments: Pregnancy test is negative here.  Patient feels the abdominal pain is outside of typical symptoms.  CT scan of the abdomen pelvis and lab evaluation will be performed.    No acute antibodies  on lab evaluation or CT scan of the abdomen pelvis.    I suspect the patient's current bleeding is related to menstrual cycle.  Crampy abdominal pain is also most likely related to menstrual cycle in combination with PCOS.    The patient will be advised to rest, drink plenty fluids, take Tylenol or ibuprofen as needed for pain.    Follow-up with gynecology for further outpatient evaluation.       Amount and/or Complexity of Data Reviewed  Clinical lab tests: ordered and reviewed  Tests in the radiology section of CPT®: ordered and reviewed  Review and summarize past medical records: yes  Independent visualization of images, tracings, or specimens: yes        Final diagnoses:   Vaginal bleeding   Abdominal cramping       ED Disposition  ED Disposition     ED Disposition   Discharge    Condition   Stable    Comment   --             No follow-up provider specified.       Medication List      No changes were made to your prescriptions during this visit.          Tanya Wood MD  03/05/22 9577

## 2022-03-05 NOTE — EXTERNAL PATIENT INSTRUCTIONS
Patient Education   Table of Contents       Abdominal Pain, Adult     To view videos and all your education online visit,   [email or web address]   or scan this QR code with your smartphone.                  Abdominal Pain, Adult     Many things can cause belly (abdominal) pain. Most times, belly pain is not dangerous. Many cases of belly pain can be watched and treated at home. Sometimes, though, belly pain is serious. Your doctor will try to find the cause of your belly pain.   Follow these instructions at home:      Medicines         Take over-the-counter and prescription medicines only as told by your doctor.      Do not  take medicines that help you poop (laxatives) unless told by your doctor.     General instructions         Watch your belly pain for any changes.       Drink enough fluid to keep your pee (urine) pale yellow.       Keep all follow-up visits as told by your doctor. This is important.       Contact a doctor if:         Your belly pain changes or gets worse.       You are not hungry, or you lose weight without trying.       You are having trouble pooping (constipated) or have watery poop (diarrhea) for more than 2?3 days.       You have pain when you pee or poop.       Your belly pain wakes you up at night.       Your pain gets worse with meals, after eating, or with certain foods.       You are vomiting and cannot keep anything down.       You have a fever.       You have blood in your pee.     Get help right away if:         Your pain does not go away as soon as your doctor says it should.       You cannot stop vomiting.       Your pain is only in areas of your belly, such as the right side or the left lower part of the belly.       You have bloody or black poop, or poop that looks like tar.       You have very bad pain, cramping, or bloating in your belly.      You have signs of not having enough fluid or water in your body (dehydration), such as:       Dark pee, very little pee, or  no pee.       Cracked lips.       Dry mouth.       Sunken eyes.       Sleepiness.       Weakness.       You have trouble breathing or chest pain.     Summary         Many cases of belly pain can be watched and treated at home.       Watch your belly pain for any changes.       Take over-the-counter and prescription medicines only as told by your doctor.       Contact a doctor if your belly pain changes or gets worse.       Get help right away if you have very bad pain, cramping, or bloating in your belly.     This information is not intended to replace advice given to you by your health care provider. Make sure you discuss any questions you have with your health care provider.     Document Released: 06/05/2009Document Revised: 04/27/2020Document Reviewed: 04/27/2020     [employer] Patient Education ? 2021 [employer]

## 2022-11-15 ENCOUNTER — OFFICE VISIT (OUTPATIENT)
Dept: OBSTETRICS AND GYNECOLOGY | Facility: CLINIC | Age: 22
End: 2022-11-15

## 2022-11-15 VITALS
DIASTOLIC BLOOD PRESSURE: 78 MMHG | OXYGEN SATURATION: 98 % | SYSTOLIC BLOOD PRESSURE: 116 MMHG | HEART RATE: 89 BPM | HEIGHT: 65 IN | BODY MASS INDEX: 41.65 KG/M2 | RESPIRATION RATE: 18 BRPM | WEIGHT: 250 LBS

## 2022-11-15 DIAGNOSIS — O20.0 THREATENED ABORTION: Primary | ICD-10-CM

## 2022-11-15 PROCEDURE — 99202 OFFICE O/P NEW SF 15 MIN: CPT | Performed by: OBSTETRICS & GYNECOLOGY

## 2022-11-15 RX ORDER — PNV NO.95/FERROUS FUM/FOLIC AC 28MG-0.8MG
1 TABLET ORAL DAILY
COMMUNITY
Start: 2022-11-07 | End: 2022-11-15 | Stop reason: SDUPTHER

## 2022-11-15 RX ORDER — PNV NO.95/FERROUS FUM/FOLIC AC 28MG-0.8MG
1 TABLET ORAL DAILY
Qty: 100 TABLET | Refills: 2 | Status: SHIPPED | OUTPATIENT
Start: 2022-11-15

## 2022-11-15 NOTE — PROGRESS NOTES
"    Chief Complaint   Patient presents with   • Threatened Miscarriage          HPI  Lila De Paz is a 22 y.o. female, , who presents with cramping.    Recent Tests:  She had a urine pregnancy test that was done 8 days ago that was positive..    US today: Yes.  Findings showed IUP 5w5d.  I have personally evaluated the U/S and agree with the findings. She has not had prenatal care.  She denies associated abdominal or pelvic pain.. Her past medical history is non-contributory.  She does not have passage of tissue.  Rh Status: Positive  She also complains of nausea.    The additional following portions of the patient's history were reviewed and updated as appropriate: allergies, current medications, past family history, past medical history, past social history, past surgical history and problem list.    Review of Systems  All other systems reviewed and are negative.     I have reviewed and agree with the HPI, ROS, and historical information as entered above. Griselda Baca MD    Objective   /78   Pulse 89   Resp 18   Ht 165.1 cm (65\")   Wt 113 kg (250 lb)   LMP 2022 (Exact Date)   SpO2 98%   BMI 41.60 kg/m²     Physical Exam not done        Assessment and Plan    Problem List Items Addressed This Visit    None      1. Threatened AB US is nl with pos FHT will repeat US 1 month   2. Options discussed with patient.  3. 1 month   Return in about 1 month (around 12/15/2022) for us .    Counseling was given to patient for the following topics: instructions for management . Total time of the encounter was 10 minutes and 10 minutes was spend counseling.      Griselda Baca MD  11/15/2022    "

## 2022-12-06 ENCOUNTER — TELEPHONE (OUTPATIENT)
Dept: OBSTETRICS AND GYNECOLOGY | Facility: CLINIC | Age: 22
End: 2022-12-06

## 2022-12-06 ENCOUNTER — OFFICE VISIT (OUTPATIENT)
Dept: OBSTETRICS AND GYNECOLOGY | Facility: CLINIC | Age: 22
End: 2022-12-06

## 2022-12-06 VITALS
WEIGHT: 246.4 LBS | SYSTOLIC BLOOD PRESSURE: 120 MMHG | BODY MASS INDEX: 41.05 KG/M2 | DIASTOLIC BLOOD PRESSURE: 76 MMHG | HEIGHT: 65 IN

## 2022-12-06 DIAGNOSIS — R35.0 URINARY FREQUENCY: Primary | ICD-10-CM

## 2022-12-06 DIAGNOSIS — M54.50 LOW BACK PAIN, UNSPECIFIED BACK PAIN LATERALITY, UNSPECIFIED CHRONICITY, UNSPECIFIED WHETHER SCIATICA PRESENT: ICD-10-CM

## 2022-12-06 DIAGNOSIS — R42 DIZZINESS: ICD-10-CM

## 2022-12-06 LAB
BILIRUB BLD-MCNC: NEGATIVE MG/DL
CLARITY, POC: CLEAR
COLOR UR: YELLOW
GLUCOSE UR STRIP-MCNC: NEGATIVE MG/DL
KETONES UR QL: NEGATIVE
LEUKOCYTE EST, POC: ABNORMAL
NITRITE UR-MCNC: NEGATIVE MG/ML
PH UR: 6 [PH] (ref 5–8)
PROT UR STRIP-MCNC: NEGATIVE MG/DL
RBC # UR STRIP: NEGATIVE /UL
SP GR UR: 1.02 (ref 1–1.03)
UROBILINOGEN UR QL: NORMAL

## 2022-12-06 PROCEDURE — 81002 URINALYSIS NONAUTO W/O SCOPE: CPT | Performed by: NURSE PRACTITIONER

## 2022-12-06 PROCEDURE — 99212 OFFICE O/P EST SF 10 MIN: CPT | Performed by: NURSE PRACTITIONER

## 2022-12-06 NOTE — TELEPHONE ENCOUNTER
Pt reports she is approx 9 weeks pregnant. She states that she reported to the [facility] ER the weekend of 11/26 11/27 for cramping with no bleeding. She states the ER treated her for a UTI for 7 days but she never had s/s of UTI. She also feels like she has to urinate more frequently. She denies dysuria, only occasional low back pain. She denies cramping now. She also reports dizziness and floaters in her vision mostly when going from sitting to standing or making quick movements. She states the ER mentioned her BP was alittle high. She does not have access to a BP cuff at this time. Discussed this with GENI Oliveira, per Margaret patient needs to increase fluid intake, make sure she is well hydrated, make slow movements when turning or going from sitting to standing. Pt made aware of these recommendations. Pt VU. Instructed pt to call back with dysuria or worsening symptoms.       -SIMONA Cobian

## 2022-12-06 NOTE — TELEPHONE ENCOUNTER
Spoke with patient regarding concerns. Patient denies any vaginal bleeding. Patient advised she could come in for CCUA and possible blood work. Patient V/U.

## 2022-12-06 NOTE — PROGRESS NOTES
"            Chief Complaint   Patient presents with   • Urinary Frequency   • low back pain       Subjective   HPI  Lila De Paz is a 22 y.o. female, . Her last LMP was Patient's last menstrual period was 2022 (exact date). who presents for urinary frequency, lower back pain, floaters in her eyes and dizziness. CCUA today.     Patient was last seen 11/15/2022 for TAB due to cramping in early pregnancy. Patient has NOB scheduled 2022. Patient denies any vaginal bleeding.     Patient reports that she went to [place] ER on 2022 and was told she had a UTI. Patient was treated with Cefdinir. Patient states that she didn't know that she had a UTI, but her new symptoms started 4 days ago. Patient completed medication 2022.     Patient also reports that yesterday while she was lying down, she heard a loud \"popping\" noise and saw her stomach twitch. Patient reports that she never had any pain associated with that.     Additional OB/GYN History     Last Pap : ~2018  Last Completed Pap Smear     This patient has no relevant Health Maintenance data.          Last mammogram: Never   Last Completed Mammogram     This patient has no relevant Health Maintenance data.          Tobacco Usage?: No   OB History        1    Para   0    Term   0       0    AB   0    Living   0       SAB   0    IAB   0    Ectopic   0    Molar        Multiple   0    Live Births   0                  Current Outpatient Medications:   •  Prenatal Vit-Fe Fumarate-FA (prenatal vitamin 28-0.8) 28-0.8 MG tablet tablet, Take 1 tablet by mouth Daily., Disp: 100 tablet, Rfl: 2     Past Medical History:   Diagnosis Date   • Amenorrhea due to Depo Provera    • Anemia    • Anxiety    • Asthma    • B12 deficiency    • Bipolar disorder (HCC)    • Depression    • Diabetes mellitus (HCC)    • GERD (gastroesophageal reflux disease)    • Pneumonia    • Polycystic ovary syndrome    • Seasonal allergies         Past " Surgical History:   Procedure Laterality Date   • ADENOIDECTOMY     • TONSILLECTOMY         The additional following portions of the patient's history were reviewed and updated as appropriate: allergies and current medications.    Review of Systems   Constitutional: Negative.    Cardiovascular: Negative.    Gastrointestinal: Negative.    Genitourinary: Positive for frequency.   Neurological: Positive for dizziness.       I have reviewed and agree with the HPI, ROS, and historical information as entered above. Sherrill Bland RN    Objective   LMP 01/21/2022 (Exact Date)     Physical Exam  Vitals and nursing note reviewed.   Constitutional:       Appearance: Normal appearance.   Neurological:      Mental Status: She is alert.         Assessment & Plan     Assessment     Problem List Items Addressed This Visit    None  Visit Diagnoses     Urinary frequency    -  Primary    Low back pain, unspecified back pain laterality, unspecified chronicity, unspecified whether sciatica present                  Plan     CCUA 1+ leuks, negative blood. Urine sent for culture. Advised decrease carbonated drinks, increase water intake  2.   CBC, CMP. TSH labs  3.   Call for any bleeding, or increased pain.  4.   Keep NOB appt. As scheduled for next Tuesday 12/13/22. Will need NOB labs drawn then.       Sherrill Bland RN  12/06/2022

## 2022-12-06 NOTE — TELEPHONE ENCOUNTER
PT CALLING BACK SAYING NOT FEELING COMFORTABLE AND WOULD LIKE TO BE SCHEDULE, CALLED NON CLINICAL &  ADVISED TO SEND TE    PT EXPRESSED FRUSTRATION & HUNG UP

## 2022-12-07 LAB
ALBUMIN SERPL-MCNC: 4.4 G/DL (ref 3.9–5)
ALBUMIN/GLOB SERPL: 1.8 {RATIO} (ref 1.2–2.2)
ALP SERPL-CCNC: 66 IU/L (ref 44–121)
ALT SERPL-CCNC: 12 IU/L (ref 0–32)
AST SERPL-CCNC: 9 IU/L (ref 0–40)
BILIRUB SERPL-MCNC: <0.2 MG/DL (ref 0–1.2)
BUN SERPL-MCNC: 7 MG/DL (ref 6–20)
BUN/CREAT SERPL: 11 (ref 9–23)
CALCIUM SERPL-MCNC: 9.5 MG/DL (ref 8.7–10.2)
CHLORIDE SERPL-SCNC: 101 MMOL/L (ref 96–106)
CO2 SERPL-SCNC: 22 MMOL/L (ref 20–29)
CREAT SERPL-MCNC: 0.66 MG/DL (ref 0.57–1)
EGFRCR SERPLBLD CKD-EPI 2021: 126 ML/MIN/1.73
ERYTHROCYTE [DISTWIDTH] IN BLOOD BY AUTOMATED COUNT: 15.5 % (ref 11.7–15.4)
GLOBULIN SER CALC-MCNC: 2.4 G/DL (ref 1.5–4.5)
GLUCOSE SERPL-MCNC: 85 MG/DL (ref 70–99)
HCT VFR BLD AUTO: 35.6 % (ref 34–46.6)
HGB BLD-MCNC: 11.3 G/DL (ref 11.1–15.9)
MCH RBC QN AUTO: 24.5 PG (ref 26.6–33)
MCHC RBC AUTO-ENTMCNC: 31.7 G/DL (ref 31.5–35.7)
MCV RBC AUTO: 77 FL (ref 79–97)
PLATELET # BLD AUTO: 250 X10E3/UL (ref 150–450)
POTASSIUM SERPL-SCNC: 4.1 MMOL/L (ref 3.5–5.2)
PROT SERPL-MCNC: 6.8 G/DL (ref 6–8.5)
RBC # BLD AUTO: 4.62 X10E6/UL (ref 3.77–5.28)
SODIUM SERPL-SCNC: 138 MMOL/L (ref 134–144)
TSH SERPL DL<=0.005 MIU/L-ACNC: 0.68 UIU/ML (ref 0.45–4.5)
WBC # BLD AUTO: 9.6 X10E3/UL (ref 3.4–10.8)

## 2022-12-08 LAB
BACTERIA UR CULT: NORMAL
BACTERIA UR CULT: NORMAL

## 2022-12-13 ENCOUNTER — INITIAL PRENATAL (OUTPATIENT)
Dept: OBSTETRICS AND GYNECOLOGY | Facility: CLINIC | Age: 22
End: 2022-12-13

## 2022-12-13 VITALS — DIASTOLIC BLOOD PRESSURE: 84 MMHG | SYSTOLIC BLOOD PRESSURE: 126 MMHG | WEIGHT: 247 LBS | BODY MASS INDEX: 41.1 KG/M2

## 2022-12-13 DIAGNOSIS — Z34.00 SUPERVISION OF NORMAL FIRST PREGNANCY, ANTEPARTUM: Primary | ICD-10-CM

## 2022-12-13 PROCEDURE — 0501F PRENATAL FLOW SHEET: CPT | Performed by: OBSTETRICS & GYNECOLOGY

## 2022-12-13 NOTE — PROGRESS NOTES
Initial ob visit     CC- Here for care of pregnancy        Lila De Paz is a 22 y.o. female, , who presents for her first obstetrical visit.  Her last LMP was Patient's last menstrual period was 2022 (exact date).. Her YVES is Not found.. Current GA is Unknown.     Initial positive test date : 2022, HCG        Her periods are: every 3 weeks  Prior obstetric issues: none  Patient's past medical history is significant for: N/A.  Family history of genetic issues (includes FOB): None  Prior infections concerning in pregnancy (Rash, fever in last 2 weeks): No  Varicella Hx - vaccinated  Prior testing for Cystic Fibrosis Carrier or Sickle Cell Trait- none  Prepregnancy BMI - Body mass index is 41.1 kg/m².  History of STD: no  Hx of HSV for patient or partner: no  Ultrasound Today: yes    OB History    Para Term  AB Living   1 0 0 0 0 0   SAB IAB Ectopic Molar Multiple Live Births   0 0 0   0 0      # Outcome Date GA Lbr Demond/2nd Weight Sex Delivery Anes PTL Lv   1 Current                Additional Pertinent History   Last Pap : 2018 Result: negative HPV: negative     Last Completed Pap Smear     This patient has no relevant Health Maintenance data.        History of abnormal Pap smear: no  Family history of uterine, colon, breast, or ovarian cancer: no  Feelings of Anxiety or Depression: no  Tobacco Usage?: No   Alcohol/Drug Use?: NO  Over the age of 35 at delivery: yes  Desires Genetic Screening: Cell Free DNA  Flu Status: Declines    PMH    Current Outpatient Medications:   •  Prenatal Vit-Fe Fumarate-FA (prenatal vitamin 28-0.8) 28-0.8 MG tablet tablet, Take 1 tablet by mouth Daily., Disp: 100 tablet, Rfl: 2     Past Medical History:   Diagnosis Date   • Amenorrhea due to Depo Provera    • Anemia    • Anxiety    • Asthma    • B12 deficiency    • Bipolar disorder (HCC)    • Depression    • Diabetes mellitus (HCC)    • GERD (gastroesophageal reflux disease)    • Pneumonia     • Polycystic ovary syndrome    • Seasonal allergies         Past Surgical History:   Procedure Laterality Date   • ADENOIDECTOMY     • TONSILLECTOMY         Review of Systems   Review of Systems  Patient Reports: Nausea and Nausea and vomiting  Patient Denies: Spotting, Heavy bleeding, Cramping and Fatigue  All systems reviewed and otherwise normal.    I have reviewed and agree with the HPI, ROS, and historical information as entered above. Griselda Baca MD    /84   Wt 112 kg (247 lb)   LMP 01/21/2022 (Exact Date)   BMI 41.10 kg/m²     The additional following portions of the patient's history were reviewed and updated as appropriate: allergies, current medications, past family history, past medical history, past social history, past surgical history and problem list.    Physical Exam  General:  well developed; well nourished  no acute distress   Chest/Respiratory: No labored breathing, normal respiratory effort, normal appearance, no respiratory noises noted   Heart:  normal rate, regular rhythm,  no murmurs, rubs, or gallops   Thyroid: normal to inspection and palpation   Breasts:  Not performed.   Abdomen: soft, non-tender; no masses  no umbilical or inguinal hernias are present  no hepato-splenomegaly   Pelvis: Not performed.        Assessment and Plan    Problem List Items Addressed This Visit    None  Visit Diagnoses     Supervision of normal first pregnancy, antepartum    -  Primary    Relevant Orders    [id number] PLUS Core+SCA+ESS - Blood,          1. Pregnancy at Unknown  2. Reviewed routine prenatal care with the office and educational materials given  3. Lab(s) Ordered  4. Discussed options for genetic testing including first trimester nuchal translucency screen, genetic disease carrier testing, quadruple screen, and [place].  Return for will be moving on .      Griselda Baca MD  12/13/2022

## 2022-12-13 NOTE — PROGRESS NOTES
Initial ob visit     CC- Here for care of pregnancy        Llia De Paz is a 22 y.o. female, , who presents for her first obstetrical visit.  Her last LMP was Patient's last menstrual period was 2022 (exact date).. Her YVES is Not found.. Current GA is Unknown.     Initial positive test date : 2022, HCG        Her periods are: every 3 weeks  Prior obstetric issues: none  Patient's past medical history is significant for: N/A.  Family history of genetic issues (includes FOB): None  Prior infections concerning in pregnancy (Rash, fever in last 2 weeks): No  Varicella Hx - vaccinated  Prior testing for Cystic Fibrosis Carrier or Sickle Cell Trait- none  Prepregnancy BMI - Body mass index is 41.1 kg/m².  History of STD: no  Hx of HSV for patient or partner: no  Ultrasound Today: yes    OB History    Para Term  AB Living   1 0 0 0 0 0   SAB IAB Ectopic Molar Multiple Live Births   0 0 0   0 0      # Outcome Date GA Lbr Demond/2nd Weight Sex Delivery Anes PTL Lv   1 Current                Additional Pertinent History   Last Pap : 2018 Result: negative HPV: negative     Last Completed Pap Smear     This patient has no relevant Health Maintenance data.        History of abnormal Pap smear: no  Family history of uterine, colon, breast, or ovarian cancer: no  Feelings of Anxiety or Depression: no  Tobacco Usage?: No   Alcohol/Drug Use?: NO  Over the age of 35 at delivery: yes  Desires Genetic Screening: Cell Free DNA  Flu Status: Declines    PMH    Current Outpatient Medications:   •  Prenatal Vit-Fe Fumarate-FA (prenatal vitamin 28-0.8) 28-0.8 MG tablet tablet, Take 1 tablet by mouth Daily., Disp: 100 tablet, Rfl: 2     Past Medical History:   Diagnosis Date   • Amenorrhea due to Depo Provera    • Anemia    • Anxiety    • Asthma    • B12 deficiency    • Bipolar disorder (HCC)    • Depression    • Diabetes mellitus (HCC)    • GERD (gastroesophageal reflux disease)    • Pneumonia     • Polycystic ovary syndrome    • Seasonal allergies         Past Surgical History:   Procedure Laterality Date   • ADENOIDECTOMY     • TONSILLECTOMY         Review of Systems   Review of Systems   Eyes: Positive for itching.     Patient Reports: Nausea and Nausea and vomiting  Patient Denies: Spotting, Heavy bleeding, Cramping and Fatigue  All systems reviewed and otherwise normal.    I have reviewed and agree with the HPI, ROS, and historical information as entered above. Griselda Baca MD    /84   Wt 112 kg (247 lb)   LMP 01/21/2022 (Exact Date)   BMI 41.10 kg/m²     The additional following portions of the patient's history were reviewed and updated as appropriate: allergies, current medications, past family history, past medical history, past social history, past surgical history and problem list.    Physical Exam  General:  well developed; well nourished  no acute distress   Chest/Respiratory: No labored breathing, normal respiratory effort, normal appearance, no respiratory noises noted   Heart:  normal rate, regular rhythm,  no murmurs, rubs, or gallops   Thyroid: normal to inspection and palpation   Breasts:  Not performed.   Abdomen: soft, non-tender; no masses  no umbilical or inguinal hernias are present  no hepato-splenomegaly   Pelvis: Not performed.  Vaginal:  normal pink mucosa without prolapse or lesions.  Cervix:  normal appearance.  Uterus:  normal size, shape and consistency.  Adnexa:  normal bimanual exam of the adnexa.  Rectal:  digital rectal exam not performed; anus visually normal appearing.        Assessment and Plan    Problem List Items Addressed This Visit    None  Visit Diagnoses     Supervision of normal first pregnancy, antepartum    -  Primary    Relevant Orders    [id number] PLUS Core+SCA+ESS - Blood,          1. Pregnancy at Unknown  2. Reviewed routine prenatal care with the office and educational materials given  3. Lab(s) Ordered  4. Discussed options for genetic  testing including first trimester nuchal translucency screen, genetic disease carrier testing, quadruple screen, and [place].  5. delivering At [facility]   Return for will be moving on .      Griselda Baca MD  12/13/2022

## 2022-12-20 ENCOUNTER — TELEPHONE (OUTPATIENT)
Dept: OBSTETRICS AND GYNECOLOGY | Facility: CLINIC | Age: 22
End: 2022-12-20

## 2022-12-20 NOTE — TELEPHONE ENCOUNTER
Caller: CARMEN JESUS    Relationship: SELF    Best call back number: [phone]    What is the best time to reach you: ANYTIME IS GOOD TO CALL; WILL BE AT WORK, SO IF NO ANSWER THE FIRST TIME, PLEASE CALL RIGHT BACK. PT WILL BE GOING TO A PRIVATE AREA OF THE OFFICE TO SPEAK WITH YOU.    Who are you requesting to speak with (clinical staff, provider,  specific staff member): CLINICAL STAFF    What was the call regarding: PT'S MY CHART IS DOWN AND WANTING LAB RESULTS RELEASED.     Do you require a callback: YES